# Patient Record
Sex: FEMALE | Race: OTHER | HISPANIC OR LATINO | URBAN - METROPOLITAN AREA
[De-identification: names, ages, dates, MRNs, and addresses within clinical notes are randomized per-mention and may not be internally consistent; named-entity substitution may affect disease eponyms.]

---

## 2018-08-28 ENCOUNTER — EMERGENCY (EMERGENCY)
Facility: HOSPITAL | Age: 63
LOS: 0 days | Discharge: HOME | End: 2018-08-28
Attending: STUDENT IN AN ORGANIZED HEALTH CARE EDUCATION/TRAINING PROGRAM
Payer: MEDICARE

## 2018-08-28 VITALS
SYSTOLIC BLOOD PRESSURE: 125 MMHG | OXYGEN SATURATION: 99 % | RESPIRATION RATE: 14 BRPM | HEART RATE: 61 BPM | DIASTOLIC BLOOD PRESSURE: 61 MMHG | TEMPERATURE: 98 F

## 2018-08-28 VITALS
WEIGHT: 130.07 LBS | TEMPERATURE: 98 F | RESPIRATION RATE: 18 BRPM | OXYGEN SATURATION: 99 % | HEIGHT: 65 IN | DIASTOLIC BLOOD PRESSURE: 64 MMHG | HEART RATE: 86 BPM | SYSTOLIC BLOOD PRESSURE: 137 MMHG

## 2018-08-28 DIAGNOSIS — Z88.9 ALLERGY STATUS TO UNSPECIFIED DRUGS, MEDICAMENTS AND BIOLOGICAL SUBSTANCES: ICD-10-CM

## 2018-08-28 DIAGNOSIS — J32.9 CHRONIC SINUSITIS, UNSPECIFIED: ICD-10-CM

## 2018-08-28 DIAGNOSIS — R05 COUGH: ICD-10-CM

## 2018-08-28 DIAGNOSIS — F40.240 CLAUSTROPHOBIA: ICD-10-CM

## 2018-08-28 DIAGNOSIS — Z88.0 ALLERGY STATUS TO PENICILLIN: ICD-10-CM

## 2018-08-28 DIAGNOSIS — J40 BRONCHITIS, NOT SPECIFIED AS ACUTE OR CHRONIC: ICD-10-CM

## 2018-08-28 DIAGNOSIS — F32.9 MAJOR DEPRESSIVE DISORDER, SINGLE EPISODE, UNSPECIFIED: ICD-10-CM

## 2018-08-28 DIAGNOSIS — J45.909 UNSPECIFIED ASTHMA, UNCOMPLICATED: ICD-10-CM

## 2018-08-28 LAB
ALBUMIN SERPL ELPH-MCNC: 4 G/DL — SIGNIFICANT CHANGE UP (ref 3.3–5)
ALP SERPL-CCNC: 61 U/L — SIGNIFICANT CHANGE UP (ref 40–120)
ALT FLD-CCNC: 22 U/L — SIGNIFICANT CHANGE UP (ref 12–78)
ANION GAP SERPL CALC-SCNC: 10 MMOL/L — SIGNIFICANT CHANGE UP (ref 5–17)
APTT BLD: 30 SEC — SIGNIFICANT CHANGE UP (ref 27.5–37.4)
AST SERPL-CCNC: 16 U/L — SIGNIFICANT CHANGE UP (ref 15–37)
BASOPHILS # BLD AUTO: 0.04 K/UL — SIGNIFICANT CHANGE UP (ref 0–0.2)
BASOPHILS NFR BLD AUTO: 0.7 % — SIGNIFICANT CHANGE UP (ref 0–2)
BILIRUB SERPL-MCNC: 0.8 MG/DL — SIGNIFICANT CHANGE UP (ref 0.2–1.2)
BUN SERPL-MCNC: 13 MG/DL — SIGNIFICANT CHANGE UP (ref 7–23)
CALCIUM SERPL-MCNC: 9.7 MG/DL — SIGNIFICANT CHANGE UP (ref 8.5–10.1)
CHLORIDE SERPL-SCNC: 105 MMOL/L — SIGNIFICANT CHANGE UP (ref 96–108)
CO2 SERPL-SCNC: 28 MMOL/L — SIGNIFICANT CHANGE UP (ref 22–31)
CREAT SERPL-MCNC: 0.78 MG/DL — SIGNIFICANT CHANGE UP (ref 0.5–1.3)
EOSINOPHIL # BLD AUTO: 0.13 K/UL — SIGNIFICANT CHANGE UP (ref 0–0.5)
EOSINOPHIL NFR BLD AUTO: 2.3 % — SIGNIFICANT CHANGE UP (ref 0–6)
GLUCOSE SERPL-MCNC: 125 MG/DL — HIGH (ref 70–99)
HCT VFR BLD CALC: 36.8 % — SIGNIFICANT CHANGE UP (ref 34.5–45)
HGB BLD-MCNC: 11.9 G/DL — SIGNIFICANT CHANGE UP (ref 11.5–15.5)
IMM GRANULOCYTES NFR BLD AUTO: 0.2 % — SIGNIFICANT CHANGE UP (ref 0–1.5)
INR BLD: 1.04 RATIO — SIGNIFICANT CHANGE UP (ref 0.88–1.16)
LACTATE SERPL-SCNC: 2.2 MMOL/L — HIGH (ref 0.7–2)
LACTATE SERPL-SCNC: 3 MMOL/L — HIGH (ref 0.7–2)
LYMPHOCYTES # BLD AUTO: 1.79 K/UL — SIGNIFICANT CHANGE UP (ref 1–3.3)
LYMPHOCYTES # BLD AUTO: 32.3 % — SIGNIFICANT CHANGE UP (ref 13–44)
MCHC RBC-ENTMCNC: 28.5 PG — SIGNIFICANT CHANGE UP (ref 27–34)
MCHC RBC-ENTMCNC: 32.3 GM/DL — SIGNIFICANT CHANGE UP (ref 32–36)
MCV RBC AUTO: 88 FL — SIGNIFICANT CHANGE UP (ref 80–100)
MONOCYTES # BLD AUTO: 0.46 K/UL — SIGNIFICANT CHANGE UP (ref 0–0.9)
MONOCYTES NFR BLD AUTO: 8.3 % — SIGNIFICANT CHANGE UP (ref 2–14)
NEUTROPHILS # BLD AUTO: 3.12 K/UL — SIGNIFICANT CHANGE UP (ref 1.8–7.4)
NEUTROPHILS NFR BLD AUTO: 56.2 % — SIGNIFICANT CHANGE UP (ref 43–77)
NRBC # BLD: 0 /100 WBCS — SIGNIFICANT CHANGE UP (ref 0–0)
PLATELET # BLD AUTO: 168 K/UL — SIGNIFICANT CHANGE UP (ref 150–400)
POTASSIUM SERPL-MCNC: 3.7 MMOL/L — SIGNIFICANT CHANGE UP (ref 3.5–5.3)
POTASSIUM SERPL-SCNC: 3.7 MMOL/L — SIGNIFICANT CHANGE UP (ref 3.5–5.3)
PROT SERPL-MCNC: 8.1 GM/DL — SIGNIFICANT CHANGE UP (ref 6–8.3)
PROTHROM AB SERPL-ACNC: 11.3 SEC — SIGNIFICANT CHANGE UP (ref 9.8–12.7)
RBC # BLD: 4.18 M/UL — SIGNIFICANT CHANGE UP (ref 3.8–5.2)
RBC # FLD: 12.6 % — SIGNIFICANT CHANGE UP (ref 10.3–14.5)
SODIUM SERPL-SCNC: 143 MMOL/L — SIGNIFICANT CHANGE UP (ref 135–145)
WBC # BLD: 5.55 K/UL — SIGNIFICANT CHANGE UP (ref 3.8–10.5)
WBC # FLD AUTO: 5.55 K/UL — SIGNIFICANT CHANGE UP (ref 3.8–10.5)

## 2018-08-28 PROCEDURE — 99284 EMERGENCY DEPT VISIT MOD MDM: CPT

## 2018-08-28 PROCEDURE — 70450 CT HEAD/BRAIN W/O DYE: CPT | Mod: 26

## 2018-08-28 PROCEDURE — 71045 X-RAY EXAM CHEST 1 VIEW: CPT | Mod: 26

## 2018-08-28 RX ORDER — AZITHROMYCIN 500 MG/1
500 TABLET, FILM COATED ORAL ONCE
Qty: 0 | Refills: 0 | Status: COMPLETED | OUTPATIENT
Start: 2018-08-28 | End: 2018-08-28

## 2018-08-28 RX ORDER — AZITHROMYCIN 500 MG/1
1 TABLET, FILM COATED ORAL
Qty: 4 | Refills: 0 | OUTPATIENT
Start: 2018-08-28 | End: 2018-08-31

## 2018-08-28 RX ORDER — SODIUM CHLORIDE 9 MG/ML
2000 INJECTION INTRAMUSCULAR; INTRAVENOUS; SUBCUTANEOUS ONCE
Qty: 0 | Refills: 0 | Status: COMPLETED | OUTPATIENT
Start: 2018-08-28 | End: 2018-08-28

## 2018-08-28 RX ADMIN — AZITHROMYCIN 255 MILLIGRAM(S): 500 TABLET, FILM COATED ORAL at 13:19

## 2018-08-28 RX ADMIN — AZITHROMYCIN 500 MILLIGRAM(S): 500 TABLET, FILM COATED ORAL at 14:45

## 2018-08-28 RX ADMIN — SODIUM CHLORIDE 1000 MILLILITER(S): 9 INJECTION INTRAMUSCULAR; INTRAVENOUS; SUBCUTANEOUS at 11:32

## 2018-08-28 RX ADMIN — SODIUM CHLORIDE 2000 MILLILITER(S): 9 INJECTION INTRAMUSCULAR; INTRAVENOUS; SUBCUTANEOUS at 14:45

## 2018-08-28 NOTE — ED ADULT NURSE NOTE - OBJECTIVE STATEMENT
received pt to bed 8 alert and oriented times 4. pt complaining of persistent cough times 2 days as well as slight chest pain.

## 2018-08-28 NOTE — ED PROVIDER NOTE - OBJECTIVE STATEMENT
63 year old female presents today c/o cold symptoms that started suddenly when she woke up this morning, pt states that she has been having a productive cough for two days, but this morning she became short of breath with coughing, she went to her pmd office where she developed chills, cough and sob, she was sent in for evaluation +chest pain with cough (-) recent travel (-) leg edema (-) hormonal medications

## 2018-08-28 NOTE — ED ADULT TRIAGE NOTE - CHIEF COMPLAINT QUOTE
BIBA for productive cough for 2 days, with chills, shortness of breath, hx of asthma. Sent by Dr delvalle office.

## 2018-08-28 NOTE — ED ADULT NURSE NOTE - NSIMPLEMENTINTERV_GEN_ALL_ED
Implemented All Universal Safety Interventions:  [place] to call system. Call bell, personal items and telephone within reach. Instruct patient to call for assistance. Room bathroom lighting operational. Non-slip footwear when patient is off stretcher. Physically safe environment: no spills, clutter or unnecessary equipment. Stretcher in lowest position, wheels locked, appropriate side rails in place.

## 2018-09-02 LAB
CULTURE RESULTS: SIGNIFICANT CHANGE UP
CULTURE RESULTS: SIGNIFICANT CHANGE UP
SPECIMEN SOURCE: SIGNIFICANT CHANGE UP
SPECIMEN SOURCE: SIGNIFICANT CHANGE UP

## 2019-04-01 ENCOUNTER — RESULT REVIEW (OUTPATIENT)
Age: 64
End: 2019-04-01

## 2019-04-04 ENCOUNTER — APPOINTMENT (OUTPATIENT)
Dept: UROGYNECOLOGY | Facility: CLINIC | Age: 64
End: 2019-04-04
Payer: MEDICARE

## 2019-04-04 DIAGNOSIS — Z80.3 FAMILY HISTORY OF MALIGNANT NEOPLASM OF BREAST: ICD-10-CM

## 2019-04-04 DIAGNOSIS — Z82.49 FAMILY HISTORY OF ISCHEMIC HEART DISEASE AND OTHER DISEASES OF THE CIRCULATORY SYSTEM: ICD-10-CM

## 2019-04-04 DIAGNOSIS — Z83.3 FAMILY HISTORY OF DIABETES MELLITUS: ICD-10-CM

## 2019-04-04 DIAGNOSIS — Z84.1 FAMILY HISTORY OF DISORDERS OF KIDNEY AND URETER: ICD-10-CM

## 2019-04-04 DIAGNOSIS — F32.9 MAJOR DEPRESSIVE DISORDER, SINGLE EPISODE, UNSPECIFIED: ICD-10-CM

## 2019-04-04 DIAGNOSIS — Z78.9 OTHER SPECIFIED HEALTH STATUS: ICD-10-CM

## 2019-04-04 DIAGNOSIS — J45.909 UNSPECIFIED ASTHMA, UNCOMPLICATED: ICD-10-CM

## 2019-04-04 DIAGNOSIS — N95.2 POSTMENOPAUSAL ATROPHIC VAGINITIS: ICD-10-CM

## 2019-04-04 DIAGNOSIS — R31.0 GROSS HEMATURIA: ICD-10-CM

## 2019-04-04 LAB
BILIRUB UR QL STRIP: NORMAL
CLARITY UR: CLEAR
COLLECTION METHOD: NORMAL
GLUCOSE UR-MCNC: NORMAL
HCG UR QL: 0.2 EU/DL
HGB UR QL STRIP.AUTO: NORMAL
KETONES UR-MCNC: NORMAL
LEUKOCYTE ESTERASE UR QL STRIP: NORMAL
NITRITE UR QL STRIP: NORMAL
PH UR STRIP: 6
PROT UR STRIP-MCNC: NORMAL
SP GR UR STRIP: 1.02

## 2019-04-04 PROCEDURE — 99204 OFFICE O/P NEW MOD 45 MIN: CPT

## 2019-04-04 RX ORDER — BUDESONIDE AND FORMOTEROL FUMARATE DIHYDRATE 80; 4.5 UG/1; UG/1
80-4.5 AEROSOL RESPIRATORY (INHALATION)
Refills: 0 | Status: ACTIVE | COMMUNITY

## 2019-04-04 RX ORDER — ALBUTEROL SULFATE 90 UG/1
INHALANT RESPIRATORY (INHALATION)
Refills: 0 | Status: ACTIVE | COMMUNITY

## 2019-04-04 RX ORDER — SERTRALINE 25 MG/1
TABLET, FILM COATED ORAL
Refills: 0 | Status: ACTIVE | COMMUNITY

## 2019-04-04 RX ORDER — OLOPATADINE HYDROCHLORIDE 1 MG/ML
SOLUTION/ DROPS OPHTHALMIC
Refills: 0 | Status: ACTIVE | COMMUNITY

## 2019-04-04 RX ORDER — RANITIDINE 75 MG/1
TABLET ORAL
Refills: 0 | Status: ACTIVE | COMMUNITY

## 2019-04-04 NOTE — PHYSICAL EXAM
[No Acute Distress] : in no acute distress [Well Nourished] : ~L well nourished [Good Hygeine] : demonstrates good hygeine [Oriented x3] : oriented to person, place, and time [Normal Mood/Affect] : mood and affect are normal [Bulbocavernous] : bulbocavernous was present [Anal Reflex] : the anal reflex was present [Warm and Dry] : was warm and dry to touch [Normal Gait] : gait was normal [Labia Majora] : were normal [Normal Appearance] : general appearance was normal [Atrophy] : atrophy [No Bleeding] : there was no active vaginal bleeding [Uterine Adnexae] : were not tender and not enlarged [Normal] : no abnormalities [Post Void Residual ____ml] : post void residual via catheterization was [unfilled] ml [Normal rectal exam] : was normal [Anxiety] : patient is not anxious [Tenderness] : ~T no ~M abdominal tenderness observed [Distended] : not distended [H/Smegaly] : no hepatosplenomegaly [Inguinal LAD] : no adenopathy was noted in the inguinal lymph nodes [de-identified] : no prolapse

## 2019-04-04 NOTE — HISTORY OF PRESENT ILLNESS
[Cystocele (Obstetric)] : none [Rectal Prolapse] : none [Unable To Restrain Bowel Movement] : none [Urinary Frequency] : none [Feelings Of Urinary Urgency] : none [Urinary Frequency More Than Twice At Night (Nocturia)] : none [Urinary Tract Infection] : none [Constipation Obstructed Defecation] : none [Stool Visible Blood] : none [Incomplete Emptying Of Stool] : none [Pelvic Pain] : none [FreeTextEntry1] : \par Presents for hematuria. Pt reports having 3 episodes of gross hematuria in her lifetime, each with a positive urine Cx. She states that she had a recent UTI that was treated with Abx. This was her only UTI in the past year. We called Dr. Johnson's office and talked to JOSE Ornelas who faxed over her culture results, which were positive for an E.coli UTI.\par \par Of note, patient is planning to undergo a Gyn procedure with [facility]. She is unsure what the procedure is or who is performing it/why it is being performed. \par \par PMH: Depression, Asthma\par PSH: open partial salpingectomy \par Meds: Albuterol, Symbicort, Ranitidine, Zyrtec

## 2019-04-04 NOTE — DISCUSSION/SUMMARY
[Reviewed Clinical Lab Test(s)] : Results of clinical tests were reviewed. [Discuss Alternatives/Risks/Benefits w/Patient] : All alternatives, risks, and benefits were discussed with the patient/family and all questions were answered.  Patient expressed good understanding and appreciates the importance of follow up as recommended. [FreeTextEntry1] : 64yo with a recent UTI (one in past year) associated with gross hematuria. Hematuria now resolved and urine dip clear today. Hematuria explained by UTI. She does not need any further workup or evaluation for hematuria. No abnormal findings on exam. She is to return if she has Sx of a UTI. She will f/u with her Gyn regarding uncertainty with her current diagnosis/future procedure.

## 2019-04-04 NOTE — PROCEDURE
[FreeTextEntry1] : Catheterization obtained to assess for hematuria. Urethral meatus prepped with betadine. Catheter inserted. PVR 50cc obtained. Pt tolerated well.

## 2019-04-04 NOTE — LETTER BODY
[Dear  ___] : Dear  [unfilled], [Dear  ___] : Dear ~NICOLE, [I had the pleasure of evaluating your patient, [unfilled]. Thank you for referring Ms. [unfilled] for consultation for ___] : I had the pleasure of evaluating your patient, [unfilled]. Thank you for referring Ms. [unfilled] for consultation for [unfilled]. [Attached please find my note.] : Attached please find my note. [Thank you very much for allowing me to participate in the care of this patient. If you have any questions, please do not hesitate to contact me] : Thank you very much for allowing me to participate in the care of this patient. If you have any questions, please do not hesitate to contact me.

## 2019-04-20 ENCOUNTER — RESULT REVIEW (OUTPATIENT)
Age: 64
End: 2019-04-20

## 2019-08-28 PROBLEM — Z00.00 ENCOUNTER FOR PREVENTIVE HEALTH EXAMINATION: Status: ACTIVE | Noted: 2019-08-28

## 2019-08-30 ENCOUNTER — APPOINTMENT (OUTPATIENT)
Dept: ORTHOPEDIC SURGERY | Facility: CLINIC | Age: 64
End: 2019-08-30
Payer: MEDICARE

## 2019-08-30 VITALS
WEIGHT: 130 LBS | SYSTOLIC BLOOD PRESSURE: 154 MMHG | BODY MASS INDEX: 21.66 KG/M2 | DIASTOLIC BLOOD PRESSURE: 84 MMHG | HEIGHT: 65 IN | HEART RATE: 66 BPM

## 2019-08-30 PROCEDURE — 99204 OFFICE O/P NEW MOD 45 MIN: CPT

## 2019-08-30 PROCEDURE — 99203 OFFICE O/P NEW LOW 30 MIN: CPT

## 2019-08-30 PROCEDURE — 72100 X-RAY EXAM L-S SPINE 2/3 VWS: CPT

## 2019-09-03 ENCOUNTER — OTHER (OUTPATIENT)
Age: 64
End: 2019-09-03

## 2019-09-04 ENCOUNTER — RX RENEWAL (OUTPATIENT)
Age: 64
End: 2019-09-04

## 2019-09-04 RX ORDER — DIAZEPAM 5 MG/1
5 TABLET ORAL
Qty: 1 | Refills: 0 | Status: ACTIVE | COMMUNITY
Start: 2019-09-04 | End: 1900-01-01

## 2019-09-09 ENCOUNTER — RX RENEWAL (OUTPATIENT)
Age: 64
End: 2019-09-09

## 2019-09-09 RX ORDER — DIAZEPAM 5 MG/1
5 TABLET ORAL
Qty: 1 | Refills: 0 | Status: ACTIVE | COMMUNITY
Start: 2019-09-09 | End: 1900-01-01

## 2019-10-01 ENCOUNTER — OUTPATIENT (OUTPATIENT)
Dept: OUTPATIENT SERVICES | Facility: HOSPITAL | Age: 64
LOS: 1 days | End: 2019-10-01
Payer: MEDICARE

## 2019-10-03 ENCOUNTER — APPOINTMENT (OUTPATIENT)
Dept: ORTHOPEDIC SURGERY | Facility: CLINIC | Age: 64
End: 2019-10-03
Payer: MEDICARE

## 2019-10-03 ENCOUNTER — APPOINTMENT (OUTPATIENT)
Dept: ORTHOPEDIC SURGERY | Facility: CLINIC | Age: 64
End: 2019-10-03

## 2019-10-03 VITALS
DIASTOLIC BLOOD PRESSURE: 80 MMHG | WEIGHT: 150 LBS | BODY MASS INDEX: 24.99 KG/M2 | SYSTOLIC BLOOD PRESSURE: 124 MMHG | HEART RATE: 65 BPM | HEIGHT: 65 IN

## 2019-10-03 DIAGNOSIS — M48.07 SPINAL STENOSIS, LUMBOSACRAL REGION: ICD-10-CM

## 2019-10-03 DIAGNOSIS — M51.36 OTHER INTERVERTEBRAL DISC DEGENERATION, LUMBAR REGION: ICD-10-CM

## 2019-10-03 PROCEDURE — 99214 OFFICE O/P EST MOD 30 MIN: CPT

## 2019-10-10 DIAGNOSIS — Z71.89 OTHER SPECIFIED COUNSELING: ICD-10-CM

## 2019-10-22 ENCOUNTER — EMERGENCY (EMERGENCY)
Facility: HOSPITAL | Age: 64
LOS: 0 days | Discharge: HOME | End: 2019-10-22
Attending: EMERGENCY MEDICINE
Payer: MEDICARE

## 2019-10-22 VITALS
OXYGEN SATURATION: 99 % | DIASTOLIC BLOOD PRESSURE: 77 MMHG | SYSTOLIC BLOOD PRESSURE: 143 MMHG | TEMPERATURE: 98 F | RESPIRATION RATE: 20 BRPM | HEART RATE: 65 BPM

## 2019-10-22 VITALS
HEART RATE: 79 BPM | OXYGEN SATURATION: 98 % | DIASTOLIC BLOOD PRESSURE: 72 MMHG | TEMPERATURE: 98 F | SYSTOLIC BLOOD PRESSURE: 118 MMHG | RESPIRATION RATE: 19 BRPM

## 2019-10-22 DIAGNOSIS — R55 SYNCOPE AND COLLAPSE: ICD-10-CM

## 2019-10-22 DIAGNOSIS — Z88.0 ALLERGY STATUS TO PENICILLIN: ICD-10-CM

## 2019-10-22 DIAGNOSIS — R42 DIZZINESS AND GIDDINESS: ICD-10-CM

## 2019-10-22 DIAGNOSIS — Z88.8 ALLERGY STATUS TO OTHER DRUGS, MEDICAMENTS AND BIOLOGICAL SUBSTANCES STATUS: ICD-10-CM

## 2019-10-22 LAB
ALBUMIN SERPL ELPH-MCNC: 3.9 G/DL — SIGNIFICANT CHANGE UP (ref 3.3–5)
ALP SERPL-CCNC: 68 U/L — SIGNIFICANT CHANGE UP (ref 40–120)
ALT FLD-CCNC: 32 U/L — SIGNIFICANT CHANGE UP (ref 12–78)
ANION GAP SERPL CALC-SCNC: 4 MMOL/L — LOW (ref 5–17)
APTT BLD: 29.2 SEC — SIGNIFICANT CHANGE UP (ref 28.5–37)
AST SERPL-CCNC: 32 U/L — SIGNIFICANT CHANGE UP (ref 15–37)
BILIRUB SERPL-MCNC: 0.5 MG/DL — SIGNIFICANT CHANGE UP (ref 0.2–1.2)
BUN SERPL-MCNC: 13 MG/DL — SIGNIFICANT CHANGE UP (ref 7–23)
CALCIUM SERPL-MCNC: 10 MG/DL — SIGNIFICANT CHANGE UP (ref 8.5–10.1)
CHLORIDE SERPL-SCNC: 105 MMOL/L — SIGNIFICANT CHANGE UP (ref 96–108)
CO2 SERPL-SCNC: 30 MMOL/L — SIGNIFICANT CHANGE UP (ref 22–31)
CREAT SERPL-MCNC: 0.56 MG/DL — SIGNIFICANT CHANGE UP (ref 0.5–1.3)
D DIMER BLD IA.RAPID-MCNC: <150 NG/ML DDU — SIGNIFICANT CHANGE UP
GLUCOSE BLDC GLUCOMTR-MCNC: 87 MG/DL — SIGNIFICANT CHANGE UP (ref 70–99)
GLUCOSE SERPL-MCNC: 92 MG/DL — SIGNIFICANT CHANGE UP (ref 70–99)
HCT VFR BLD CALC: 37.1 % — SIGNIFICANT CHANGE UP (ref 34.5–45)
HGB BLD-MCNC: 12.1 G/DL — SIGNIFICANT CHANGE UP (ref 11.5–15.5)
INR BLD: 1.02 RATIO — SIGNIFICANT CHANGE UP (ref 0.88–1.16)
MCHC RBC-ENTMCNC: 28.3 PG — SIGNIFICANT CHANGE UP (ref 27–34)
MCHC RBC-ENTMCNC: 32.6 GM/DL — SIGNIFICANT CHANGE UP (ref 32–36)
MCV RBC AUTO: 86.7 FL — SIGNIFICANT CHANGE UP (ref 80–100)
NRBC # BLD: 0 /100 WBCS — SIGNIFICANT CHANGE UP (ref 0–0)
NT-PROBNP SERPL-SCNC: 135 PG/ML — HIGH (ref 0–125)
PLATELET # BLD AUTO: 182 K/UL — SIGNIFICANT CHANGE UP (ref 150–400)
POTASSIUM SERPL-MCNC: 4.3 MMOL/L — SIGNIFICANT CHANGE UP (ref 3.5–5.3)
POTASSIUM SERPL-SCNC: 4.3 MMOL/L — SIGNIFICANT CHANGE UP (ref 3.5–5.3)
PROT SERPL-MCNC: 7.9 GM/DL — SIGNIFICANT CHANGE UP (ref 6–8.3)
PROTHROM AB SERPL-ACNC: 11.4 SEC — SIGNIFICANT CHANGE UP (ref 10–12.9)
RBC # BLD: 4.28 M/UL — SIGNIFICANT CHANGE UP (ref 3.8–5.2)
RBC # FLD: 12.6 % — SIGNIFICANT CHANGE UP (ref 10.3–14.5)
SODIUM SERPL-SCNC: 139 MMOL/L — SIGNIFICANT CHANGE UP (ref 135–145)
TROPONIN I SERPL-MCNC: <.015 NG/ML — SIGNIFICANT CHANGE UP (ref 0.01–0.04)
TROPONIN I SERPL-MCNC: <.015 NG/ML — SIGNIFICANT CHANGE UP (ref 0.01–0.04)
WBC # BLD: 6.02 K/UL — SIGNIFICANT CHANGE UP (ref 3.8–10.5)
WBC # FLD AUTO: 6.02 K/UL — SIGNIFICANT CHANGE UP (ref 3.8–10.5)

## 2019-10-22 PROCEDURE — 99285 EMERGENCY DEPT VISIT HI MDM: CPT

## 2019-10-22 PROCEDURE — 71045 X-RAY EXAM CHEST 1 VIEW: CPT | Mod: 26

## 2019-10-22 PROCEDURE — 93010 ELECTROCARDIOGRAM REPORT: CPT

## 2019-10-22 RX ORDER — SODIUM CHLORIDE 9 MG/ML
1000 INJECTION INTRAMUSCULAR; INTRAVENOUS; SUBCUTANEOUS ONCE
Refills: 0 | Status: COMPLETED | OUTPATIENT
Start: 2019-10-22 | End: 2019-10-22

## 2019-10-22 RX ORDER — ACETAMINOPHEN 500 MG
650 TABLET ORAL ONCE
Refills: 0 | Status: COMPLETED | OUTPATIENT
Start: 2019-10-22 | End: 2019-10-22

## 2019-10-22 RX ADMIN — Medication 650 MILLIGRAM(S): at 16:25

## 2019-10-22 RX ADMIN — SODIUM CHLORIDE 1000 MILLILITER(S): 9 INJECTION INTRAMUSCULAR; INTRAVENOUS; SUBCUTANEOUS at 14:55

## 2019-10-22 NOTE — ED PROVIDER NOTE - PROGRESS NOTE DETAILS
Pt has been asymptomatic during ED stay. Labs wnl, pt referred to cardiology for f/u, pt feeling well, ready for d/c.

## 2019-10-22 NOTE — ED PROVIDER NOTE - OBJECTIVE STATEMENT
Pt is a 65 yo lady with a pmhx of HTN, HL, asthma who presents to the ED with near syncope event. She was at physical therapy and doing squats when she started to feel lightheaded and see spots. She crouched down, and symptoms went away. No chest pain, no sob, no loc. No hx of this in the past. Asymptomatic now. No hx of this in the past.

## 2019-10-22 NOTE — ED PROVIDER NOTE - PATIENT PORTAL LINK FT
You can access the FollowMyHealth Patient Portal offered by [facility] by registering at the following website: [email or web address]. By joining [employer]’s FollowMyHealth portal, you will also be able to view your health information using other applications (apps) compatible with our system.

## 2019-10-22 NOTE — ED ADULT TRIAGE NOTE - CHIEF COMPLAINT QUOTE
BIBA for dizziness and syncopal episode while at physical therapy. pt complaining of involuntary shaking and shortness of breath at triage. fs 86 at triage.

## 2019-10-22 NOTE — ED ADULT NURSE NOTE - NSIMPLEMENTINTERV_GEN_ALL_ED
Implemented All Fall Risk Interventions:  [place] to call system. Call bell, personal items and telephone within reach. Instruct patient to call for assistance. Room bathroom lighting operational. Non-slip footwear when patient is off stretcher. Physically safe environment: no spills, clutter or unnecessary equipment. Stretcher in lowest position, wheels locked, appropriate side rails in place. Provide visual cue, wrist band, yellow gown, etc. Monitor gait and stability. Monitor for mental status changes and reorient to person, place, and time. Review medications for side effects contributing to fall risk. Reinforce activity limits and safety measures with patient and family.

## 2019-10-22 NOTE — ED PROVIDER NOTE - CLINICAL SUMMARY MEDICAL DECISION MAKING FREE TEXT BOX
Ddx: Near syncope, likely vasovagal/ no chest pain to suggest acs or sob to suggest PE  Plan: Cbc, cmp, troponin, ecg, fluids, reassess

## 2019-10-22 NOTE — ED ADULT NURSE NOTE - OBJECTIVE STATEMENT
pt A&Ox4 BIBA for dizziness and syncopal episode while at physical therapy. pt complaining of involuntary shaking. states the shaking occurs prior to her depressive episode. also with c/o headache. PMH Asthma, Depression herniated disc

## 2020-09-01 ENCOUNTER — INPATIENT (INPATIENT)
Facility: HOSPITAL | Age: 65
LOS: 2 days | Discharge: HOME | DRG: 392 | End: 2020-09-04
Attending: HOSPITALIST | Admitting: INTERNAL MEDICINE
Payer: MEDICARE

## 2020-09-01 VITALS
DIASTOLIC BLOOD PRESSURE: 86 MMHG | OXYGEN SATURATION: 98 % | HEART RATE: 77 BPM | RESPIRATION RATE: 18 BRPM | SYSTOLIC BLOOD PRESSURE: 138 MMHG | WEIGHT: 139.99 LBS | HEIGHT: 65 IN | TEMPERATURE: 99 F

## 2020-09-01 DIAGNOSIS — F32.9 MAJOR DEPRESSIVE DISORDER, SINGLE EPISODE, UNSPECIFIED: ICD-10-CM

## 2020-09-01 DIAGNOSIS — E78.5 HYPERLIPIDEMIA, UNSPECIFIED: ICD-10-CM

## 2020-09-01 DIAGNOSIS — R07.9 CHEST PAIN, UNSPECIFIED: ICD-10-CM

## 2020-09-01 DIAGNOSIS — E11.9 TYPE 2 DIABETES MELLITUS WITHOUT COMPLICATIONS: ICD-10-CM

## 2020-09-01 DIAGNOSIS — J45.909 UNSPECIFIED ASTHMA, UNCOMPLICATED: ICD-10-CM

## 2020-09-01 LAB
A1C WITH ESTIMATED AVERAGE GLUCOSE RESULT: 9.2 % — HIGH (ref 4–5.6)
ALBUMIN SERPL ELPH-MCNC: 4.6 G/DL — SIGNIFICANT CHANGE UP (ref 3.3–5)
ALP SERPL-CCNC: 67 U/L — SIGNIFICANT CHANGE UP (ref 40–120)
ALT FLD-CCNC: 19 U/L — SIGNIFICANT CHANGE UP (ref 10–45)
ANION GAP SERPL CALC-SCNC: 11 MMOL/L — SIGNIFICANT CHANGE UP (ref 5–17)
APTT BLD: 28.2 SEC — SIGNIFICANT CHANGE UP (ref 27.5–35.5)
AST SERPL-CCNC: 17 U/L — SIGNIFICANT CHANGE UP (ref 10–40)
BASOPHILS # BLD AUTO: 0.04 K/UL — SIGNIFICANT CHANGE UP (ref 0–0.2)
BASOPHILS NFR BLD AUTO: 0.7 % — SIGNIFICANT CHANGE UP (ref 0–2)
BILIRUB SERPL-MCNC: 0.5 MG/DL — SIGNIFICANT CHANGE UP (ref 0.2–1.2)
BUN SERPL-MCNC: 19 MG/DL — SIGNIFICANT CHANGE UP (ref 7–23)
CALCIUM SERPL-MCNC: 9.8 MG/DL — SIGNIFICANT CHANGE UP (ref 8.4–10.5)
CHLORIDE SERPL-SCNC: 101 MMOL/L — SIGNIFICANT CHANGE UP (ref 96–108)
CK MB CFR SERPL CALC: 1.6 NG/ML — SIGNIFICANT CHANGE UP (ref 0–6.7)
CK MB CFR SERPL CALC: 1.8 NG/ML — SIGNIFICANT CHANGE UP (ref 0–6.7)
CK SERPL-CCNC: 59 U/L — SIGNIFICANT CHANGE UP (ref 25–170)
CK SERPL-CCNC: 68 U/L — SIGNIFICANT CHANGE UP (ref 25–170)
CO2 SERPL-SCNC: 26 MMOL/L — SIGNIFICANT CHANGE UP (ref 22–31)
CREAT SERPL-MCNC: 0.67 MG/DL — SIGNIFICANT CHANGE UP (ref 0.5–1.3)
D DIMER BLD IA.RAPID-MCNC: 163 NG/ML DDU — SIGNIFICANT CHANGE UP
EOSINOPHIL # BLD AUTO: 0.15 K/UL — SIGNIFICANT CHANGE UP (ref 0–0.5)
EOSINOPHIL NFR BLD AUTO: 2.6 % — SIGNIFICANT CHANGE UP (ref 0–6)
ESTIMATED AVERAGE GLUCOSE: 217 MG/DL — HIGH (ref 68–114)
GLUCOSE BLDC GLUCOMTR-MCNC: 269 MG/DL — HIGH (ref 70–99)
GLUCOSE SERPL-MCNC: 165 MG/DL — HIGH (ref 70–99)
HCT VFR BLD CALC: 36.2 % — SIGNIFICANT CHANGE UP (ref 34.5–45)
HGB BLD-MCNC: 11.6 G/DL — SIGNIFICANT CHANGE UP (ref 11.5–15.5)
IMM GRANULOCYTES NFR BLD AUTO: 0.2 % — SIGNIFICANT CHANGE UP (ref 0–1.5)
INR BLD: 0.99 — SIGNIFICANT CHANGE UP (ref 0.88–1.16)
LYMPHOCYTES # BLD AUTO: 1.83 K/UL — SIGNIFICANT CHANGE UP (ref 1–3.3)
LYMPHOCYTES # BLD AUTO: 32 % — SIGNIFICANT CHANGE UP (ref 13–44)
MCHC RBC-ENTMCNC: 27.8 PG — SIGNIFICANT CHANGE UP (ref 27–34)
MCHC RBC-ENTMCNC: 32 GM/DL — SIGNIFICANT CHANGE UP (ref 32–36)
MCV RBC AUTO: 86.8 FL — SIGNIFICANT CHANGE UP (ref 80–100)
MONOCYTES # BLD AUTO: 0.44 K/UL — SIGNIFICANT CHANGE UP (ref 0–0.9)
MONOCYTES NFR BLD AUTO: 7.7 % — SIGNIFICANT CHANGE UP (ref 2–14)
NEUTROPHILS # BLD AUTO: 3.24 K/UL — SIGNIFICANT CHANGE UP (ref 1.8–7.4)
NEUTROPHILS NFR BLD AUTO: 56.8 % — SIGNIFICANT CHANGE UP (ref 43–77)
NRBC # BLD: 0 /100 WBCS — SIGNIFICANT CHANGE UP (ref 0–0)
PLATELET # BLD AUTO: 179 K/UL — SIGNIFICANT CHANGE UP (ref 150–400)
POTASSIUM SERPL-MCNC: 4.3 MMOL/L — SIGNIFICANT CHANGE UP (ref 3.5–5.3)
POTASSIUM SERPL-SCNC: 4.3 MMOL/L — SIGNIFICANT CHANGE UP (ref 3.5–5.3)
PROT SERPL-MCNC: 7.9 G/DL — SIGNIFICANT CHANGE UP (ref 6–8.3)
PROTHROM AB SERPL-ACNC: 11.9 SEC — SIGNIFICANT CHANGE UP (ref 10.6–13.6)
RBC # BLD: 4.17 M/UL — SIGNIFICANT CHANGE UP (ref 3.8–5.2)
RBC # FLD: 12.3 % — SIGNIFICANT CHANGE UP (ref 10.3–14.5)
SARS-COV-2 RNA SPEC QL NAA+PROBE: SIGNIFICANT CHANGE UP
SODIUM SERPL-SCNC: 138 MMOL/L — SIGNIFICANT CHANGE UP (ref 135–145)
TROPONIN T SERPL-MCNC: <0.01 NG/ML — SIGNIFICANT CHANGE UP (ref 0–0.01)
TROPONIN T SERPL-MCNC: <0.01 NG/ML — SIGNIFICANT CHANGE UP (ref 0–0.01)
WBC # BLD: 5.71 K/UL — SIGNIFICANT CHANGE UP (ref 3.8–10.5)
WBC # FLD AUTO: 5.71 K/UL — SIGNIFICANT CHANGE UP (ref 3.8–10.5)

## 2020-09-01 PROCEDURE — 71045 X-RAY EXAM CHEST 1 VIEW: CPT | Mod: 26

## 2020-09-01 PROCEDURE — 99285 EMERGENCY DEPT VISIT HI MDM: CPT | Mod: 25

## 2020-09-01 PROCEDURE — 99222 1ST HOSP IP/OBS MODERATE 55: CPT

## 2020-09-01 PROCEDURE — 93010 ELECTROCARDIOGRAM REPORT: CPT

## 2020-09-01 RX ORDER — DEXTROSE 50 % IN WATER 50 %
25 SYRINGE (ML) INTRAVENOUS ONCE
Refills: 0 | Status: DISCONTINUED | OUTPATIENT
Start: 2020-09-01 | End: 2020-09-04

## 2020-09-01 RX ORDER — SODIUM CHLORIDE 9 MG/ML
1000 INJECTION INTRAMUSCULAR; INTRAVENOUS; SUBCUTANEOUS
Refills: 0 | Status: DISCONTINUED | OUTPATIENT
Start: 2020-09-01 | End: 2020-09-02

## 2020-09-01 RX ORDER — INFLUENZA VIRUS VACCINE 15; 15; 15; 15 UG/.5ML; UG/.5ML; UG/.5ML; UG/.5ML
0.5 SUSPENSION INTRAMUSCULAR ONCE
Refills: 0 | Status: DISCONTINUED | OUTPATIENT
Start: 2020-09-01 | End: 2020-09-04

## 2020-09-01 RX ORDER — SODIUM CHLORIDE 9 MG/ML
3 INJECTION INTRAMUSCULAR; INTRAVENOUS; SUBCUTANEOUS ONCE
Refills: 0 | Status: COMPLETED | OUTPATIENT
Start: 2020-09-01 | End: 2020-09-01

## 2020-09-01 RX ORDER — ENOXAPARIN SODIUM 100 MG/ML
40 INJECTION SUBCUTANEOUS EVERY 24 HOURS
Refills: 0 | Status: DISCONTINUED | OUTPATIENT
Start: 2020-09-01 | End: 2020-09-04

## 2020-09-01 RX ORDER — MORPHINE SULFATE 50 MG/1
2 CAPSULE, EXTENDED RELEASE ORAL ONCE
Refills: 0 | Status: DISCONTINUED | OUTPATIENT
Start: 2020-09-01 | End: 2020-09-01

## 2020-09-01 RX ORDER — BUDESONIDE AND FORMOTEROL FUMARATE DIHYDRATE 160; 4.5 UG/1; UG/1
2 AEROSOL RESPIRATORY (INHALATION)
Refills: 0 | Status: DISCONTINUED | OUTPATIENT
Start: 2020-09-01 | End: 2020-09-04

## 2020-09-01 RX ORDER — NITROGLYCERIN 6.5 MG
0.5 CAPSULE, EXTENDED RELEASE ORAL ONCE
Refills: 0 | Status: COMPLETED | OUTPATIENT
Start: 2020-09-01 | End: 2020-09-01

## 2020-09-01 RX ORDER — GLUCAGON INJECTION, SOLUTION 0.5 MG/.1ML
1 INJECTION, SOLUTION SUBCUTANEOUS ONCE
Refills: 0 | Status: DISCONTINUED | OUTPATIENT
Start: 2020-09-01 | End: 2020-09-04

## 2020-09-01 RX ORDER — POLYETHYLENE GLYCOL 3350 17 G/17G
17 POWDER, FOR SOLUTION ORAL DAILY
Refills: 0 | Status: DISCONTINUED | OUTPATIENT
Start: 2020-09-01 | End: 2020-09-04

## 2020-09-01 RX ORDER — DEXTROSE 50 % IN WATER 50 %
12.5 SYRINGE (ML) INTRAVENOUS ONCE
Refills: 0 | Status: DISCONTINUED | OUTPATIENT
Start: 2020-09-01 | End: 2020-09-04

## 2020-09-01 RX ORDER — KETOTIFEN FUMARATE 0.34 MG/ML
1 SOLUTION OPHTHALMIC DAILY
Refills: 0 | Status: DISCONTINUED | OUTPATIENT
Start: 2020-09-01 | End: 2020-09-04

## 2020-09-01 RX ORDER — SIMVASTATIN 20 MG/1
10 TABLET, FILM COATED ORAL AT BEDTIME
Refills: 0 | Status: DISCONTINUED | OUTPATIENT
Start: 2020-09-01 | End: 2020-09-02

## 2020-09-01 RX ORDER — INSULIN LISPRO 100/ML
VIAL (ML) SUBCUTANEOUS
Refills: 0 | Status: DISCONTINUED | OUTPATIENT
Start: 2020-09-01 | End: 2020-09-04

## 2020-09-01 RX ORDER — SERTRALINE 25 MG/1
50 TABLET, FILM COATED ORAL DAILY
Refills: 0 | Status: DISCONTINUED | OUTPATIENT
Start: 2020-09-01 | End: 2020-09-04

## 2020-09-01 RX ORDER — CLOPIDOGREL BISULFATE 75 MG/1
75 TABLET, FILM COATED ORAL ONCE
Refills: 0 | Status: COMPLETED | OUTPATIENT
Start: 2020-09-01 | End: 2020-09-01

## 2020-09-01 RX ORDER — DEXTROSE 50 % IN WATER 50 %
15 SYRINGE (ML) INTRAVENOUS ONCE
Refills: 0 | Status: DISCONTINUED | OUTPATIENT
Start: 2020-09-01 | End: 2020-09-04

## 2020-09-01 RX ORDER — SODIUM CHLORIDE 9 MG/ML
1000 INJECTION, SOLUTION INTRAVENOUS
Refills: 0 | Status: DISCONTINUED | OUTPATIENT
Start: 2020-09-01 | End: 2020-09-04

## 2020-09-01 RX ADMIN — SODIUM CHLORIDE 125 MILLILITER(S): 9 INJECTION INTRAMUSCULAR; INTRAVENOUS; SUBCUTANEOUS at 20:35

## 2020-09-01 RX ADMIN — CLOPIDOGREL BISULFATE 75 MILLIGRAM(S): 75 TABLET, FILM COATED ORAL at 16:04

## 2020-09-01 RX ADMIN — SODIUM CHLORIDE 125 MILLILITER(S): 9 INJECTION INTRAMUSCULAR; INTRAVENOUS; SUBCUTANEOUS at 17:01

## 2020-09-01 RX ADMIN — SIMVASTATIN 10 MILLIGRAM(S): 20 TABLET, FILM COATED ORAL at 22:14

## 2020-09-01 RX ADMIN — BUDESONIDE AND FORMOTEROL FUMARATE DIHYDRATE 2 PUFF(S): 160; 4.5 AEROSOL RESPIRATORY (INHALATION) at 21:03

## 2020-09-01 RX ADMIN — ENOXAPARIN SODIUM 40 MILLIGRAM(S): 100 INJECTION SUBCUTANEOUS at 22:03

## 2020-09-01 RX ADMIN — SODIUM CHLORIDE 3 MILLILITER(S): 9 INJECTION INTRAMUSCULAR; INTRAVENOUS; SUBCUTANEOUS at 15:34

## 2020-09-01 RX ADMIN — Medication 6: at 22:32

## 2020-09-01 RX ADMIN — MORPHINE SULFATE 2 MILLIGRAM(S): 50 CAPSULE, EXTENDED RELEASE ORAL at 16:04

## 2020-09-01 NOTE — ED PROVIDER NOTE - PROGRESS NOTE DETAILS
Trey Welch, MS4:  Patient is HD stable and febrile.  No acute changes in exam.  EKG, trops, d-dimer wnl.  Official TTE and CXR reads pending.  S/p plavix x1 and morphine x1.

## 2020-09-01 NOTE — H&P ADULT - ATTENDING COMMENTS
See PA note written above, for details. I reviewed the PA documentation.  I have personally seen and examined this patient 9/1/20. I reviewed vitals, labs, medications, cardiac studies and additional imaging.  I agree with the PA's findings and plans as written above with the following additions/amendments:  Patient presenting with atypical chest pain, prolonged, brought on with stress of living in shelter  Non focal exam, euvolemic, RRR, no MGR, clear lungs, no edema  Plan for:  Simvastatin 10  Obtain HbA1c, TFTs, and FLP  Obtain TTE for structural assessment  NPO pMN for CCTA for coronary evaluation   KATERIN Olmos.  Cardiology Attending

## 2020-09-01 NOTE — H&P ADULT - HISTORY OF PRESENT ILLNESS
66 yo Female w/ PMHx TD2M, HLD, Major Depressive Disorder presenting with acute onset chest pain w/ radiation to L neck, jaw, and UE x3d duration BIBEMS s/p nitro in the field. Patient stated she started experiencing pain in the middle of her chest with radiation to L side, no radiation to back.  Pain is a/w N/V and SOB, intermittent waxing and waning intensity throughout day, no direct association with PO intake.  She also had 1 episode of dizziness and blurry vision during this time where she almost fell, no LoC or head trauma.  She has had similar episodes in the past (1-2x/yr) but of lesser intensity.  Denies h/o panic attacks, fevers, chills, URI sx, abd pain, changes in urine/stool frequency or color, hematuria/hematochezia/melena, focal neuromuscular deficits.  Patient is adherent with T2DM and HLD regimen, no h/o smoking, EtOH or drug use.  Patient lives in a shelter, follows up regularly with PMD with evaluations for similar episodes in past with normal EKGs.  Patient has severe allergic rxn to ASA in recent years requiring epinephrine.  She also endorses MDD treated with medication, states she did not feel safe in her shelter since "I live alone and no one would know if anything happened to me", but feels safe in ED and denies SI/HI, anxiety, depression sx at this time. 66 yo Female, shelter resident, [employer], w/ PMHx TD2M, HLD, Major Depressive Disorder presenting with worsening chest pain, radiating to left jaw, neck, and arm, with associated SOB, dizziness, diaphoresis, nausea/vomiting independent of activity, worsening over the last few months. Pain is a/w N/V and SOB, intermittent waxing and waning intensity throughout day, no direct association with PO intake.  She also had 1 episode of dizziness and blurry vision during this time where she almost fell, no LoC or head trauma.  Denies h/o panic attacks, fevers, chills, URI sx, abd pain, changes in urine/stool frequency or color, hematuria/hematochezia/melena, focal neuromuscular deficits. Pt had her most severe episode on 9/1/2020 AM, prompting her to call EMS. EMS gave pt Nitro SL x 2 with relief of symptoms. In [facility] ED /86. HR 77bpm (NSR). RR 18. Temp 99F. O2 sat 98% room air. Trop T neg x 1. D dimer wnl. EKG NSR 68bpm with no ischemic changes. In ED given Plavix 75mg x 1, Morphine 2mg IV x 1, and started on IV fluids NS 125cc/hr. Patient lives in a shelter, follows up regularly with PMD with evaluations for similar episodes in past with normal EKGs.  Patient has severe allergic rxn to ASA in recent years requiring epinephrine.  She also endorses MDD treated with medication, states she did not feel safe in her shelter since "I live alone and no one would know if anything happened to me", but feels safe in ED and denies SI/HI, anxiety, depression sx at this time. Pt is being admitted to [facility] for further management of chest pain with plan for Echo and CCTA in AM. 64 yo Female, shelter resident, SEV[employer], w/ PMHx TD2M, HLD, Asthma (last hospitalization 1 year ago, never intubated), Major Depressive Disorder presenting with worsening chest pain, radiating to left jaw, neck, and arm, with associated SOB, dizziness, diaphoresis, nausea/vomiting independent of activity, worsening over the last few months. Pain is a/w N/V and SOB, intermittent waxing and waning intensity throughout day, no direct association with PO intake.  She also had 1 episode of dizziness and blurry vision during this time where she almost fell, no LoC or head trauma.  Denies h/o panic attacks, fevers, chills, URI sx, abd pain, changes in urine/stool frequency or color, hematuria/hematochezia/melena, focal neuromuscular deficits. Pt had her most severe episode on 9/1/2020 AM, prompting her to call EMS. EMS gave pt Nitro SL x 2 with relief of symptoms. In [facility] ED /86. HR 77bpm (NSR). RR 18. Temp 99F. O2 sat 98% room air. Trop T neg x 1. D dimer wnl. EKG NSR 68bpm with no ischemic changes. In ED given Plavix 75mg x 1, Morphine 2mg IV x 1, and started on IV fluids NS 125cc/hr. Patient lives in a shelter, follows up regularly with PMD with evaluations for similar episodes in past with normal EKGs.  Patient has severe allergic rxn to ASA in recent years requiring epinephrine.  She also endorses MDD treated with medication, states she did not feel safe in her shelter since "I live alone and no one would know if anything happened to me", but feels safe in ED and denies SI/HI, anxiety, depression sx at this time. Pt is being admitted to [facility] for further management of chest pain with plan for Echo and CCTA in AM.

## 2020-09-01 NOTE — ED PROVIDER NOTE - PSYCHIATRIC, MLM
AAOx3, anxious mood and stable/full/reactive affect appropriate to situation, denies depressive or anxious sx, denies SI/HI at this time

## 2020-09-01 NOTE — H&P ADULT - PROBLEM SELECTOR PLAN 1
- currently chest pain free, resting comfortably but tearful and worried about recurrent episodes coming  - Trop neg x 1, f/u 8pm, 12am, and AM trop  - NPO after midnight for CCTA in AM.  - EKG NSR 68bpm with no ischemic changes.  - Pt had a Pharm NST a few years ago and was referred for CCTA but had anxiety and could not undergo test. Dr Crowe will attempt to convince patient to undergo CCTA in AM.   - Echo ordered  - CCTA ordered  - f/u hemoglobin A1C, lipid profile, TSH

## 2020-09-01 NOTE — H&P ADULT - NSICDXPASTMEDICALHX_GEN_ALL_CORE_FT
PAST MEDICAL HISTORY:  Asthma     Depression     DM (diabetes mellitus)     HLD (hyperlipidemia)     HTN (hypertension)

## 2020-09-01 NOTE — ED PROVIDER NOTE - CLINICAL SUMMARY MEDICAL DECISION MAKING FREE TEXT BOX
Patient is a 66yo female w/ PMHx TD2M, HLD, MDD presenting with acute onset chest pain w/ radiation to L neck, jaw, and UE x3d duration s/p nitro most concerning for acute MI vs. PE vs. alternate emergent cardiopulmonary etiologies vs. GERD vs. panic attack.  Patient is currently HD stable, afebrile, experiencing some chest discomfort in ED.  EKG without acute ST elevations/depression, BBBs, axis deviations, Q waves, or other pathologic changes.  Labs and imaging ordered, will monitor V/S closely, and treat with alternate antiplatelet tx (severe allergy to ASA).  Will consider further consultation/management as appropriate.

## 2020-09-01 NOTE — ED PROVIDER NOTE - OBJECTIVE STATEMENT
Patient is a 64yo female w/ PMHx TD2M, HLD, MDD presenting with acute onset chest pain w/ radiation to L neck, jaw, and UE x3d duration BIBEMS s/p nitro in the field. Patient stated she started experiencing pain in the middle of her chest with radiation to L side, no radiation to back.  Pain is a/w N/V and SOB, intermittent waxing and waning intensity throughout day, no direct association with PO intake.  She also had 1 episode of dizziness and blurry vision during this time where she almost fell, no LoC or head trauma.  She has had similar episodes in the past (1-2x/yr) but of lesser intensity.  Denies h/o panic attacks, fevers, chills, URI sx, abd pain, changes in urine/stool frequency or color, hematuria/hematochezia/melena, focal neuromuscular deficits.  Patient is adherent with T2DM and HLD regimen, no h/o smoking, EtOH or drug use.  Patient lives in a shelter, follows up regularly with PMD with evaluations for similar episodes in past with normal EKGs.  Patient has severe allergic rxn to ASA in recent years requiring epinephrine.  She also endorses MDD treated with medication, states she did not feel safe in her shelter since "I live alone and no one would know if anything happened to me", but feels safe in ED and denies SI/HI, anxiety, depression sx at this time.

## 2020-09-01 NOTE — H&P ADULT - ASSESSMENT
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 Pain is a/w N/V and SOB, intermittent waxing and waning intensity throughout day, no direct association with PO intake.  She also had 1 episode of dizziness and blurry vision during this time where she almost fell, no LoC or head trauma.  Denies h/o panic attacks, fevers, chills, URI sx, abd pain, changes in urine/stool frequency or color, hematuria/hematochezia/melena, focal neuromuscular deficits. Pt had her most severe episode on 9/1/2020 AM, prompting her to call EMS. EMS gave pt Nitro SL x 2 with relief of symptoms. In [facility] ED /86. HR 77bpm (NSR). RR 18. Temp 99F. O2 sat 98% room air. Trop T neg x 1. D dimer wnl. EKG NSR 68bpm with no ischemic changes. In ED given Plavix 75mg x 1, Morphine 2mg IV x 1, and started on IV fluids NS 125cc/hr. Patient lives in a shelter, follows up regularly with PMD with evaluations for similar episodes in past with normal EKGs.  Patient has severe allergic rxn to ASA in recent years requiring epinephrine.  She also endorses MDD treated with medication, states she did not feel safe in her shelter since "I live alone and no one would know if anything happened to me", but feels safe in ED and denies SI/HI, anxiety, depression sx at this time. Pt is being admitted to [facility] for further management of chest pain with plan for Echo and CCTA in AM. 66 yo Female, shelter resident, SEVERE [employer]ERGY, w/ PMHx TD2M, HLD,  Asthma (last hospitalization 1 year ago, never intubated), Major Depressive Disorder presenting with worsening chest pain, radiating to left jaw, neck, and arm, with associated SOB, dizziness, diaphoresis, nausea/vomiting independent of activity, worsening over the last few months. Pain is a/w N/V and SOB, intermittent waxing and waning intensity throughout day, no direct association with PO intake.  She also had 1 episode of dizziness and blurry vision during this time where she almost fell, no LoC or head trauma.  Denies h/o panic attacks, fevers, chills, URI sx, abd pain, changes in urine/stool frequency or color, hematuria/hematochezia/melena, focal neuromuscular deficits. Pt had her most severe episode on 9/1/2020 AM, prompting her to call EMS. EMS gave pt Nitro SL x 2 with relief of symptoms. In [facility] ED /86. HR 77bpm (NSR). RR 18. Temp 99F. O2 sat 98% room air. Trop T neg x 1. D dimer wnl. EKG NSR 68bpm with no ischemic changes. In ED given Plavix 75mg x 1, Morphine 2mg IV x 1, and started on IV fluids NS 125cc/hr. Patient lives in a shelter, follows up regularly with PMD with evaluations for similar episodes in past with normal EKGs.  Patient has severe allergic rxn to ASA in recent years requiring epinephrine.  She also endorses MDD treated with medication, states she did not feel safe in her shelter since "I live alone and no one would know if anything happened to me", but feels safe in ED and denies SI/HI, anxiety, depression sx at this time. Pt is being admitted to [facility] for further management of chest pain with plan for Echo and CCTA in AM.

## 2020-09-01 NOTE — ED ADULT NURSE NOTE - ED STAT RN HANDOFF DETAILS
pt admitted for further cardiac work up, remains stable. no bed available in telemetry unit. report given to RN in holding.

## 2020-09-01 NOTE — ED ADULT TRIAGE NOTE - HEIGHT IN INCHES
Occupational Therapy   Treatment    Name: Zeyad Woodard  MRN: [id number]  Admitting Diagnosis:  HCAP (healthcare-associated pneumonia)       Recommendations:     Discharge Recommendations:  (TBD)  Discharge Equipment Recommendations:  none  Barriers to discharge:  None    Subjective     Communicated with: nurseDunia prior to session.  Pain/Comfort:  · Pain Rating 1: 0/10  · Pain Rating Post-Intervention 1: 0/10    Patients cultural, spiritual, Mosque conflicts given the current situation:  (none reported)    Objective:     Patient found with: telemetry, peripheral IV    General Precautions: Standard, aspiration, fall, NPO   Orthopedic Precautions:N/A   Braces: N/A     Bed Mobility:    · Patient completed Rolling/Turning to Left with  maximal assistance and with side rail  · Patient completed Scooting/Bridging with total assistance  · Patient completed Supine to Sit with total assistance  · Patient completed Sit to Supine with total assistance     Functional Mobility/Transfers:  · Patient completed Sit <> Stand Transfer with maximal assistance and of 2 persons  with  no assistive device     Activities of Daily Living:  · Grooming: stand by assistance    · Toileting: total assistance  incontinent    Patient left HOB elevated with all lines intact, call button in reach, bed alarm on, RN notified and daughters present    [facility] 6 Click:  [facility] Total Score: 9    Treatment & Education:  Bed mob as noted above. Sat EOB x 15 mins with SBA-CGA. See PT note for therex. Patient used oral swab and washed face with SBA. Sit <> stand with Min (A) of 2 x 20 seconds with max (A) to maintain upright.   Education:    Assessment:   Patient with good EOB tolerance and alertness this date. Will benefit from continued skilled OT to address functional deficits.     Zeyad Woodard is a 89 y.o. male with a medical diagnosis of HCAP (healthcare-associated pneumonia).  Performance deficits affecting function are weakness, impaired  endurance, impaired self care skills, impaired functional mobilty, gait instability, impaired balance, pain, impaired skin, edema, decreased coordination, decreased safety awareness, decreased lower extremity function, decreased upper extremity function.      Rehab Prognosis:  Fair; patient would benefit from acute skilled OT services to address these deficits and reach maximum level of function.       Plan:     Patient to be seen 5 x/week to address the above listed problems via self-care/home management, therapeutic activities, therapeutic exercises  · Plan of Care Expires: 05/06/18  · Plan of Care Reviewed with: patient, family    This Plan of care has been discussed with the patient who was involved in its development and understands and is in agreement with the identified goals and treatment plan    GOALS:    Occupational Therapy Goals        Problem: Occupational Therapy Goal    Goal Priority Disciplines Outcome Interventions   Occupational Therapy Goal     OT, PT/OT Ongoing (interventions implemented as appropriate)    Description:  Goals to be met by: 05/06/18     Patient will increase functional independence with ADLs by performing:    Grooming while seated with Set-up Assistance.  Toileting from bedside commode with Moderate Assistance for hygiene and clothing management.   Supine to sit with Stand-by Assistance and use of bedrail as needed.  Toilet transfer to bedside commode with Contact Guard Assistance.  Increased functional strength to WFL for ADLs.                      Time Tracking:     OT Date of Treatment: 04/17/18  OT Start Time: 1024  OT Stop Time: 1050  OT Total Time (min): 26 mins with PTA    Billable Minutes:Self Care/Home Management 13    MADDY Mendoza  4/17/2018     5

## 2020-09-01 NOTE — ED PROVIDER NOTE - CONSTITUTIONAL, MLM
normal... AAOx3, patient appears nervous and in mild discomfort, appears stated age, no other apparent distress

## 2020-09-01 NOTE — H&P ADULT - PROBLEM SELECTOR PLAN 4
- lungs CTA b/l  - last hospitalization 1 year ago, never intubated  - Continue Albuterol nebs and Spiriva 160mg-4.5mg 2 puffs BID

## 2020-09-01 NOTE — H&P ADULT - PROBLEM SELECTOR PLAN 5
- pt tearful and states she does not feel comfortable living in her shelter  - Pt denies SI/HI  - pt attempted to contact her  and is upset she did not call her back  - Consult SW in AM     VTE PPX: Lovenox  Dispo: pending clinical progression

## 2020-09-01 NOTE — ED ADULT NURSE NOTE - OBJECTIVE STATEMENT
66 y/o female BIBA c/o chest pain and SOB x 4 days. pt was at the clinic and sent here for cardiac evaluation.

## 2020-09-02 DIAGNOSIS — R63.8 OTHER SYMPTOMS AND SIGNS CONCERNING FOOD AND FLUID INTAKE: ICD-10-CM

## 2020-09-02 DIAGNOSIS — R13.10 DYSPHAGIA, UNSPECIFIED: ICD-10-CM

## 2020-09-02 DIAGNOSIS — E11.9 TYPE 2 DIABETES MELLITUS WITHOUT COMPLICATIONS: ICD-10-CM

## 2020-09-02 DIAGNOSIS — F32.9 MAJOR DEPRESSIVE DISORDER, SINGLE EPISODE, UNSPECIFIED: ICD-10-CM

## 2020-09-02 DIAGNOSIS — R07.89 OTHER CHEST PAIN: ICD-10-CM

## 2020-09-02 LAB
A1C WITH ESTIMATED AVERAGE GLUCOSE RESULT: 9.9 % — HIGH (ref 4–5.6)
ANION GAP SERPL CALC-SCNC: 9 MMOL/L — SIGNIFICANT CHANGE UP (ref 5–17)
BUN SERPL-MCNC: 17 MG/DL — SIGNIFICANT CHANGE UP (ref 7–23)
CALCIUM SERPL-MCNC: 8.4 MG/DL — SIGNIFICANT CHANGE UP (ref 8.4–10.5)
CHLORIDE SERPL-SCNC: 105 MMOL/L — SIGNIFICANT CHANGE UP (ref 96–108)
CHOLEST SERPL-MCNC: 212 MG/DL — HIGH (ref 10–199)
CK MB CFR SERPL CALC: 1.2 NG/ML — SIGNIFICANT CHANGE UP (ref 0–6.7)
CK MB CFR SERPL CALC: 1.4 NG/ML — SIGNIFICANT CHANGE UP (ref 0–6.7)
CK SERPL-CCNC: 48 U/L — SIGNIFICANT CHANGE UP (ref 25–170)
CK SERPL-CCNC: 53 U/L — SIGNIFICANT CHANGE UP (ref 25–170)
CO2 SERPL-SCNC: 25 MMOL/L — SIGNIFICANT CHANGE UP (ref 22–31)
CREAT SERPL-MCNC: 0.72 MG/DL — SIGNIFICANT CHANGE UP (ref 0.5–1.3)
ESTIMATED AVERAGE GLUCOSE: 237 MG/DL — HIGH (ref 68–114)
GLUCOSE BLDC GLUCOMTR-MCNC: 143 MG/DL — HIGH (ref 70–99)
GLUCOSE BLDC GLUCOMTR-MCNC: 144 MG/DL — HIGH (ref 70–99)
GLUCOSE BLDC GLUCOMTR-MCNC: 190 MG/DL — HIGH (ref 70–99)
GLUCOSE BLDC GLUCOMTR-MCNC: 258 MG/DL — HIGH (ref 70–99)
GLUCOSE SERPL-MCNC: 262 MG/DL — HIGH (ref 70–99)
HCT VFR BLD CALC: 32 % — LOW (ref 34.5–45)
HCV AB S/CO SERPL IA: 0.09 S/CO — SIGNIFICANT CHANGE UP
HCV AB SERPL-IMP: SIGNIFICANT CHANGE UP
HDLC SERPL-MCNC: 30 MG/DL — LOW
HGB BLD-MCNC: 10.1 G/DL — LOW (ref 11.5–15.5)
HIV 1+2 AB+HIV1 P24 AG SERPL QL IA: SIGNIFICANT CHANGE UP
LIPID PNL WITH DIRECT LDL SERPL: 105 MG/DL — HIGH
MAGNESIUM SERPL-MCNC: 1.6 MG/DL — SIGNIFICANT CHANGE UP (ref 1.6–2.6)
MCHC RBC-ENTMCNC: 28.1 PG — SIGNIFICANT CHANGE UP (ref 27–34)
MCHC RBC-ENTMCNC: 31.6 GM/DL — LOW (ref 32–36)
MCV RBC AUTO: 89.1 FL — SIGNIFICANT CHANGE UP (ref 80–100)
NRBC # BLD: 0 /100 WBCS — SIGNIFICANT CHANGE UP (ref 0–0)
PLATELET # BLD AUTO: 157 K/UL — SIGNIFICANT CHANGE UP (ref 150–400)
POTASSIUM SERPL-MCNC: 4.6 MMOL/L — SIGNIFICANT CHANGE UP (ref 3.5–5.3)
POTASSIUM SERPL-SCNC: 4.6 MMOL/L — SIGNIFICANT CHANGE UP (ref 3.5–5.3)
RBC # BLD: 3.59 M/UL — LOW (ref 3.8–5.2)
RBC # FLD: 12.5 % — SIGNIFICANT CHANGE UP (ref 10.3–14.5)
SODIUM SERPL-SCNC: 139 MMOL/L — SIGNIFICANT CHANGE UP (ref 135–145)
TOTAL CHOLESTEROL/HDL RATIO MEASUREMENT: 7.1 RATIO — SIGNIFICANT CHANGE UP (ref 3.3–7.1)
TRIGL SERPL-MCNC: 384 MG/DL — HIGH (ref 10–149)
TROPONIN T SERPL-MCNC: <0.01 NG/ML — SIGNIFICANT CHANGE UP (ref 0–0.01)
TROPONIN T SERPL-MCNC: <0.01 NG/ML — SIGNIFICANT CHANGE UP (ref 0–0.01)
TSH SERPL-MCNC: 1.58 UIU/ML — SIGNIFICANT CHANGE UP (ref 0.35–4.94)
WBC # BLD: 5.88 K/UL — SIGNIFICANT CHANGE UP (ref 3.8–10.5)
WBC # FLD AUTO: 5.88 K/UL — SIGNIFICANT CHANGE UP (ref 3.8–10.5)

## 2020-09-02 PROCEDURE — 93306 TTE W/DOPPLER COMPLETE: CPT | Mod: 26

## 2020-09-02 PROCEDURE — 99223 1ST HOSP IP/OBS HIGH 75: CPT

## 2020-09-02 PROCEDURE — 75574 CT ANGIO HRT W/3D IMAGE: CPT | Mod: 26

## 2020-09-02 PROCEDURE — 93010 ELECTROCARDIOGRAM REPORT: CPT

## 2020-09-02 PROCEDURE — 99222 1ST HOSP IP/OBS MODERATE 55: CPT | Mod: GC

## 2020-09-02 PROCEDURE — 99233 SBSQ HOSP IP/OBS HIGH 50: CPT

## 2020-09-02 RX ORDER — ATORVASTATIN CALCIUM 80 MG/1
40 TABLET, FILM COATED ORAL AT BEDTIME
Refills: 0 | Status: DISCONTINUED | OUTPATIENT
Start: 2020-09-02 | End: 2020-09-04

## 2020-09-02 RX ORDER — ACETAMINOPHEN 500 MG
650 TABLET ORAL ONCE
Refills: 0 | Status: COMPLETED | OUTPATIENT
Start: 2020-09-02 | End: 2020-09-02

## 2020-09-02 RX ORDER — INSULIN LISPRO 100/ML
5 VIAL (ML) SUBCUTANEOUS
Refills: 0 | Status: DISCONTINUED | OUTPATIENT
Start: 2020-09-02 | End: 2020-09-03

## 2020-09-02 RX ORDER — INSULIN GLARGINE 100 [IU]/ML
50 INJECTION, SOLUTION SUBCUTANEOUS AT BEDTIME
Refills: 0 | Status: DISCONTINUED | OUTPATIENT
Start: 2020-09-02 | End: 2020-09-02

## 2020-09-02 RX ORDER — SODIUM CHLORIDE 9 MG/ML
1000 INJECTION INTRAMUSCULAR; INTRAVENOUS; SUBCUTANEOUS
Refills: 0 | Status: DISCONTINUED | OUTPATIENT
Start: 2020-09-02 | End: 2020-09-03

## 2020-09-02 RX ORDER — INSULIN GLARGINE 100 [IU]/ML
15 INJECTION, SOLUTION SUBCUTANEOUS AT BEDTIME
Refills: 0 | Status: DISCONTINUED | OUTPATIENT
Start: 2020-09-02 | End: 2020-09-03

## 2020-09-02 RX ADMIN — Medication 0.5 INCH(S): at 13:30

## 2020-09-02 RX ADMIN — Medication 6: at 07:21

## 2020-09-02 RX ADMIN — Medication 0.5 INCH(S): at 00:18

## 2020-09-02 RX ADMIN — ENOXAPARIN SODIUM 40 MILLIGRAM(S): 100 INJECTION SUBCUTANEOUS at 22:04

## 2020-09-02 RX ADMIN — Medication 2: at 22:03

## 2020-09-02 RX ADMIN — BUDESONIDE AND FORMOTEROL FUMARATE DIHYDRATE 2 PUFF(S): 160; 4.5 AEROSOL RESPIRATORY (INHALATION) at 22:05

## 2020-09-02 RX ADMIN — KETOTIFEN FUMARATE 1 DROP(S): 0.34 SOLUTION OPHTHALMIC at 22:26

## 2020-09-02 RX ADMIN — Medication 650 MILLIGRAM(S): at 04:40

## 2020-09-02 RX ADMIN — ATORVASTATIN CALCIUM 40 MILLIGRAM(S): 80 TABLET, FILM COATED ORAL at 22:04

## 2020-09-02 RX ADMIN — SODIUM CHLORIDE 125 MILLILITER(S): 9 INJECTION INTRAMUSCULAR; INTRAVENOUS; SUBCUTANEOUS at 20:45

## 2020-09-02 RX ADMIN — Medication 650 MILLIGRAM(S): at 03:43

## 2020-09-02 RX ADMIN — BUDESONIDE AND FORMOTEROL FUMARATE DIHYDRATE 2 PUFF(S): 160; 4.5 AEROSOL RESPIRATORY (INHALATION) at 10:53

## 2020-09-02 RX ADMIN — INSULIN GLARGINE 15 UNIT(S): 100 INJECTION, SOLUTION SUBCUTANEOUS at 22:26

## 2020-09-02 NOTE — PROGRESS NOTE ADULT - SUBJECTIVE AND OBJECTIVE BOX
Hospital Course  66 yo Female, shelter resident w/ PMHx DMII HLD, Asthma (last hospitalization 1 year ago, never intubated), Major Depressive Disorder, and severe ASA allergy who presented to the ED on 9/1/20 with reports of chest pain radiating to left jaw, neck, and arm, with associated SOB, dizziness, diaphoresis, nausea/vomiting independent of activity, worsening over the last few months. Trop neg x 3. EKG NSR 68bpm with no ischemic changes. In ED given Plavix 75mg x 1, Morphine 2mg IV x 1 and started on IV fluids NS 125cc/hr. Pt initially admitted to [facility] for further management of chest pain and to r/o ACS. Troponin negative x 3, CCTA 9/2/20 revealed non-obstructive coronary artery disease. TTE 9/2/20 revealed no cardiac pathology with an EF 55%. VSS, no events on telemetry. Endocrine consulted for recommendations on diabetes management. Pt with HgbA1c of 9.9. SW consulted to evaluate pt's home situation as she lives in a shelter and reported feeling unsafe. Pt endorsed pain and difficulty swallowing liquids and solids. Reports history of GERD but does not take medications for it. Pt stable from a cardiac standpoint and transferred to the [facility] for further evaluation of dysphagia/odynophagia. Of note, patient says she feels unsafe at her shelter due to COVID and really doesn't want to go back there.     SUBJECTIVE:    Pt seen and examined at bedside. States chest pain still present and has pain with solid and liquid intake. Denies fevers, sob, n/v, dizziness, palpitations, diarrhea, cough.        PHYSICAL EXAM:  Vital Signs Last 24 Hrs  T(C): 36.7 (02 Sep 2020 13:00), Max: 37.2 (01 Sep 2020 18:54)  T(F): 98 (02 Sep 2020 13:00), Max: 99 (01 Sep 2020 18:54)  HR: 67 (02 Sep 2020 15:00) (62 - 77)  BP: 130/64 (02 Sep 2020 15:00) (103/54 - 138/86)  BP(mean): 103 (01 Sep 2020 18:54) (103 - 103)  RR: 18 (02 Sep 2020 15:00) (18 - 18)  SpO2: 97% (02 Sep 2020 15:00) (95% - 99%)    General: NAD, laying in bed, well appearing, alert, interactive  HEENT: PERRLA, no scleral icterus, no JVD, no thyromegaly  Cardio: +S1/S2, RRR, no murmurs  Resp: CTA b/l, no w/r/r  GI: +BSx4, soft, NT/ND  Extremities: no edema, clubbing, cyanosis  Vasc: 3+ peripheral pulses  Skin: No rashes  Neuro: AAOx3, CN II-XII grossly intact, no focal deficits      MEDICATIONS  (STANDING):  atorvastatin 40 milliGRAM(s) Oral at bedtime  budesonide 160 MICROgram(s)/formoterol 4.5 MICROgram(s) Inhaler 2 Puff(s) Inhalation two times a day  dextrose 5%. 1000 milliLiter(s) (50 mL/Hr) IV Continuous <Continuous>  dextrose 50% Injectable 12.5 Gram(s) IV Push once  dextrose 50% Injectable 25 Gram(s) IV Push once  dextrose 50% Injectable 25 Gram(s) IV Push once  enoxaparin Injectable 40 milliGRAM(s) SubCutaneous every 24 hours  influenza   Vaccine 0.5 milliLiter(s) IntraMuscular once  insulin glargine Injectable (LANTUS) 15 Unit(s) SubCutaneous at bedtime  insulin lispro (HumaLOG) corrective regimen sliding scale   SubCutaneous Before meals and at bedtime  insulin lispro Injectable (HumaLOG) 5 Unit(s) SubCutaneous three times a day before meals  ketotifen 0.025% Ophthalmic Solution 1 Drop(s) Both EYES daily  polyethylene glycol 3350 17 Gram(s) Oral daily  sertraline 50 milliGRAM(s) Oral daily    MEDICATIONS  (PRN):  dextrose 40% Gel 15 Gram(s) Oral once PRN Blood Glucose LESS THAN 70 milliGRAM(s)/deciliter  glucagon  Injectable 1 milliGRAM(s) IntraMuscular once PRN Glucose LESS THAN 70 milligrams/deciliter        Allergies    aspirin (Rash)  penicillin (Rash)    Intolerances          LABS:                        10.1   5.88  )-----------( 157      ( 02 Sep 2020 06:07 )             32.0     09-02    139  |  105  |  17  ----------------------------<  262<H>  4.6   |  25  |  0.72    Ca    8.4      02 Sep 2020 06:07  Mg     1.6     09-02    TPro  7.9  /  Alb  4.6  /  TBili  0.5  /  DBili  x   /  AST  17  /  ALT  19  /  AlkPhos  67  09-01    PT/INR - ( 01 Sep 2020 15:33 )   PT: 11.9 sec;   INR: 0.99          PTT - ( 01 Sep 2020 15:33 )  PTT:28.2 sec  Fingerstick  glucose: POCT Blood Glucose.: 144 mg/dL (02 Sep 2020 16:55)        RADIOLOGY & ADDITIONAL TESTS: Reviewed. Hospital Course  64 yo Female, shelter resident w/ PMHx DMII HLD, Asthma (last hospitalization 1 year ago, never intubated), Major Depressive Disorder, and severe ASA allergy who presented to the ED on 9/1/20 with reports of chest pain radiating to left jaw, neck, and arm, with associated SOB, dizziness, diaphoresis, nausea/vomiting independent of activity, worsening over the last few months. Trop neg x 3. EKG NSR 68bpm with no ischemic changes. In ED given Plavix 75mg x 1, Morphine 2mg IV x 1 and started on IV fluids NS 125cc/hr. Pt initially admitted to [facility] for further management of chest pain and to r/o ACS. CCTA 9/2/20 revealed non-obstructive coronary artery disease. TTE 9/2/20 revealed no cardiac pathology with an EF 55%. VSS, no events on telemetry. Endocrine consulted for recommendations on diabetes management. Pt with HgbA1c of 9.9. SW consulted to evaluate pt's home situation as she lives in a shelter and reported feeling unsafe. Pt endorsed pain and difficulty swallowing liquids and solids. Reports history of GERD but does not take medications for it. Pt stable from a cardiac standpoint and transferred to the [facility] for further evaluation of dysphagia/odynophagia. Of note, patient says she feels unsafe at her shelter due to COVID and really doesn't want to go back there.     SUBJECTIVE:    Pt seen and examined at bedside. States chest pain still present and has pain with solid and liquid intake. Denies fevers, sob, n/v, dizziness, palpitations, diarrhea, cough.        PHYSICAL EXAM:  Vital Signs Last 24 Hrs  T(C): 36.7 (02 Sep 2020 13:00), Max: 37.2 (01 Sep 2020 18:54)  T(F): 98 (02 Sep 2020 13:00), Max: 99 (01 Sep 2020 18:54)  HR: 67 (02 Sep 2020 15:00) (62 - 77)  BP: 130/64 (02 Sep 2020 15:00) (103/54 - 138/86)  BP(mean): 103 (01 Sep 2020 18:54) (103 - 103)  RR: 18 (02 Sep 2020 15:00) (18 - 18)  SpO2: 97% (02 Sep 2020 15:00) (95% - 99%)    General: NAD, laying in bed, well appearing, alert, interactive  HEENT: PERRLA, no scleral icterus, no JVD, no thyromegaly  Cardio: +S1/S2, RRR, no murmurs  Resp: CTA b/l, no w/r/r  GI: +BSx4, soft, NT/ND  Extremities: no edema, clubbing, cyanosis  Vasc: 3+ peripheral pulses  Skin: No rashes  Neuro: AAOx3, CN II-XII grossly intact, no focal deficits      MEDICATIONS  (STANDING):  atorvastatin 40 milliGRAM(s) Oral at bedtime  budesonide 160 MICROgram(s)/formoterol 4.5 MICROgram(s) Inhaler 2 Puff(s) Inhalation two times a day  dextrose 5%. 1000 milliLiter(s) (50 mL/Hr) IV Continuous <Continuous>  dextrose 50% Injectable 12.5 Gram(s) IV Push once  dextrose 50% Injectable 25 Gram(s) IV Push once  dextrose 50% Injectable 25 Gram(s) IV Push once  enoxaparin Injectable 40 milliGRAM(s) SubCutaneous every 24 hours  influenza   Vaccine 0.5 milliLiter(s) IntraMuscular once  insulin glargine Injectable (LANTUS) 15 Unit(s) SubCutaneous at bedtime  insulin lispro (HumaLOG) corrective regimen sliding scale   SubCutaneous Before meals and at bedtime  insulin lispro Injectable (HumaLOG) 5 Unit(s) SubCutaneous three times a day before meals  ketotifen 0.025% Ophthalmic Solution 1 Drop(s) Both EYES daily  polyethylene glycol 3350 17 Gram(s) Oral daily  sertraline 50 milliGRAM(s) Oral daily    MEDICATIONS  (PRN):  dextrose 40% Gel 15 Gram(s) Oral once PRN Blood Glucose LESS THAN 70 milliGRAM(s)/deciliter  glucagon  Injectable 1 milliGRAM(s) IntraMuscular once PRN Glucose LESS THAN 70 milligrams/deciliter        Allergies    aspirin (Rash)  penicillin (Rash)    Intolerances          LABS:                        10.1   5.88  )-----------( 157      ( 02 Sep 2020 06:07 )             32.0     09-02    139  |  105  |  17  ----------------------------<  262<H>  4.6   |  25  |  0.72    Ca    8.4      02 Sep 2020 06:07  Mg     1.6     09-02    TPro  7.9  /  Alb  4.6  /  TBili  0.5  /  DBili  x   /  AST  17  /  ALT  19  /  AlkPhos  67  09-01    PT/INR - ( 01 Sep 2020 15:33 )   PT: 11.9 sec;   INR: 0.99          PTT - ( 01 Sep 2020 15:33 )  PTT:28.2 sec  Fingerstick  glucose: POCT Blood Glucose.: 144 mg/dL (02 Sep 2020 16:55)        RADIOLOGY & ADDITIONAL TESTS: Reviewed.

## 2020-09-02 NOTE — PROVIDER CONTACT NOTE (OTHER) - SITUATION
attempted speech and swallow by bedside. Patient reports pain when swallowing and previously stated that she feels like she's "choking" when she eats

## 2020-09-02 NOTE — CONSULT NOTE ADULT - SUBJECTIVE AND OBJECTIVE BOX
****INCOMPLETE****    64 yo Female, shelter resident, SEVERE [employer]IRIN [employer], w/ PMHx DM II, HLD, Asthma (last hospitalization 1 year ago, never intubated), Major Depressive Disorder who presented to the ED 9/1/20 with reports of chest pain radiating to left jaw, neck and arm with associated SOB, dizziness, diaphoresis, N/V independent of activity, worsening over the last few months. Trop neg x 3. EKG NSR 68bpm with no ischemic changes. In ED given Plavix 75mg x 1, Morphine 2mg IV x 1 and started on IV fluids NS 125cc/hr. Pt initially admitted to [facility] for further management of chest pain with plan for ECHO and CCTA, both of which were negative. Patient now reporting dysphagia with associated chest pain of variable intensity x1 week, made worse with food, and is worse at night. Pt reports that she think she have a h/o GERD but doesn't take any meds for it. She denies experiencing a sour taste in her mouth. ROS otherwise negative for fever, chills, SOB, n/v/c/d, numbness, or tingling.      PAST MEDICAL/SURGICAL HISTORY  PAST MEDICAL & SURGICAL HISTORY:  HTN (hypertension)  Depression  HLD (hyperlipidemia)  DM (diabetes mellitus)  Asthma      REVIEW OF SYSTEMS:  CONSTITUTIONAL: No fever, weight loss, or fatigue  EYES: No eye pain, visual disturbances, or discharge  ENMT:  No difficulty hearing, tinnitus, vertigo; No sinus or throat pain  NECK: No pain or stiffness  BREASTS: No pain, masses, or nipple discharge  RESPIRATORY: No cough, wheezing, chills or hemoptysis; No shortness of breath  CARDIOVASCULAR: No chest pain, palpitations, dizziness, or leg swelling  GASTROINTESTINAL: No abdominal or epigastric pain. No nausea, vomiting, or hematemesis; No diarrhea or constipation. No melena or hematochezia.  GENITOURINARY: No dysuria, frequency, hematuria, or incontinence  NEUROLOGICAL: No headaches, memory loss, loss of strength, numbness, or tremors  SKIN: No itching, burning, rashes, or lesions   LYMPH NODES: No enlarged glands  ENDOCRINE: No heat or cold intolerance; No hair loss  MUSCULOSKELETAL: No joint pain or swelling; No muscle, back, or extremity pain  PSYCHIATRIC: No depression, anxiety, mood swings, or difficulty sleeping  HEME/LYMPH: No easy bruising, or bleeding gums  ALLERY AND IMMUNOLOGIC: No hives or eczema    T(C): 36.7 (09-02-20 @ 13:00), Max: 37.2 (09-01-20 @ 18:54)  HR: 67 (09-02-20 @ 15:00) (60 - 77)  BP: 130/64 (09-02-20 @ 15:00) (103/54 - 138/86)  RR: 18 (09-02-20 @ 15:00) (16 - 18)  SpO2: 97% (09-02-20 @ 15:00) (95% - 99%)  Wt(kg): --Vital Signs Last 24 Hrs  T(C): 36.7 (02 Sep 2020 13:00), Max: 37.2 (01 Sep 2020 18:54)  T(F): 98 (02 Sep 2020 13:00), Max: 99 (01 Sep 2020 18:54)  HR: 67 (02 Sep 2020 15:00) (60 - 77)  BP: 130/64 (02 Sep 2020 15:00) (103/54 - 138/86)  BP(mean): 103 (01 Sep 2020 18:54) (103 - 103)  RR: 18 (02 Sep 2020 15:00) (16 - 18)  SpO2: 97% (02 Sep 2020 15:00) (95% - 99%)    PHYSICAL EXAM:  GENERAL: NAD, well-groomed, well-developed  HEAD:  Atraumatic, Normocephalic  EYES: EOMI, PERRLA, conjunctiva and sclera clear  ENMT: No tonsillar erythema, exudates, or enlargement; Moist mucous membranes, Good dentition, No lesions  NECK: Supple, No JVD, Normal thyroid  NERVOUS SYSTEM:  Alert & Oriented X3, Good concentration; Motor Strength 5/5 B/L upper and lower extremities; DTRs 2+ intact and symmetric  CHEST/LUNG: Clear to percussion bilaterally; No rales, rhonchi, wheezing, or rubs  HEART: Regular rate and rhythm; No murmurs, rubs, or gallops  ABDOMEN: Soft, Nontender, Nondistended; Bowel sounds present  EXTREMITIES:  2+ Peripheral Pulses, No clubbing, cyanosis, or edema  LYMPH: No lymphadenopathy noted  SKIN: No rashes or lesions    Consultant(s) Notes Reviewed:  [x ] YES  [ ] NO  Care Discussed with Consultants/Other Providers [ x] YES  [ ] NO    LABS:  CBC   09-02-20 @ 06:07  Hematcorit 32.0  Hemoglobin 10.1  Mean Cell Hemoglobin 28.1  Platelet Count-Automated 157  RBC Count 3.59  Red Cell Distrib Width 12.5  Wbc Count 5.88      BMP  09-02-20 @ 06:07  Anion Gap. Serum 9  Blood Urea Nitrogen,Serm 17  Calcium, Total Serum 8.4  Carbon Dioxide, Serum 25  Chloride, Serum 105  Creatinine, Serum 0.72  eGFR in  101  eGFR in Non Afican American 87  Gloucose, serum 262  Potassium, Serum 4.6  Sodium, Serum 139              09-01-20 @ 15:32  Anion Gap. Serum 11  Blood Urea Nitrogen,Serm 19  Calcium, Total Serum 9.8  Carbon Dioxide, Serum 26  Chloride, Serum 101  Creatinine, Serum 0.67  eGFR in  106  eGFR in Non Afican American 92  Gloucose, serum 165  Potassium, Serum 4.3  Sodium, Serum 138                  CMP  09-02-20 @ 06:07  Maia Aminotransferase(ALT/SGPT)--  Albumin, Serum --  Alkaline Phosphatase, Serum --  Anion Gap, Serum 9  Aspartate Aminotransferase (AST/SGOT)--  Bilirubin Total, Serum --  Blood Urea Nitrogen, Serum 17  Calcium,Total Serum 8.4  Carbon Dioxide, Serum 25  Chloride, Serum 105  Creatinine, Serum 0.72  eGFR if  101  eGFR if Non African American 87  Glucose, Serum 262  Potassium, Serum 4.6  Protein Total, Serum --  Sodium, Serum 139                      09-01-20 @ 15:32  Maia Aminotransferase(ALT/SGPT)19  Albumin, Serum 4.6  Alkaline Phosphatase, Serum 67  Anion Gap, Serum 11  Aspartate Aminotransferase (AST/SGOT)17  Bilirubin Total, Serum 0.5  Blood Urea Nitrogen, Serum 19  Calcium,Total Serum 9.8  Carbon Dioxide, Serum 26  Chloride, Serum 101  Creatinine, Serum 0.67  eGFR if  106  eGFR if Non African American 92  Glucose, Serum 165  Potassium, Serum 4.3  Protein Total, Serum 7.9  Sodium, Serum 138                          PT/INR  PT/INR  09-01-20 @ 15:33  INR 0.99  Prothrombin Time Comment --  Prothrobin Time, [id number]      Amylase/Lipase            RADIOLOGY & ADDITIONAL TESTS:    Imaging Personally Reviewed:  [ ] YES  [ ] NO 66 yo Female, shelter resident, SEVERE ASPIRIN ALLERGY, w/ PMHx DM II, HLD, Asthma (last hospitalization 1 year ago, never intubated), Major Depressive Disorder who presented to the ED 9/1/20 with reports of chest pain radiating to left jaw, neck and arm with associated SOB, dizziness, diaphoresis, N/V independent of activity, worsening over the last few months. Trop neg x 3. EKG NSR 68bpm with no ischemic changes. In ED given Plavix 75mg x 1, Morphine 2mg IV x 1 and started on IV fluids NS 125cc/hr. Pt initially admitted to [facility] for further management of chest pain with plan for ECHO and CCTA, both of which were negative. Patient now reporting dysphagia with associated chest pain of variable intensity x1 week, made worse with food, and is worse at night. Pt reports that she think she have a h/o GERD but doesn't take any meds for it. She denies experiencing a sour taste in her mouth. ROS otherwise negative for fever, chills, SOB, n/v/c/d, numbness, or tingling. Of note, patient says she feels unsafe at her shelter due to COVID and really doesn't want to go back there.       PAST MEDICAL/SURGICAL HISTORY  PAST MEDICAL & SURGICAL HISTORY:  HTN (hypertension)  Depression  HLD (hyperlipidemia)  DM (diabetes mellitus)  Asthma      REVIEW OF SYSTEMS:  CONSTITUTIONAL: No fever, weight loss, or fatigue  EYES: No eye pain, visual disturbances, or discharge  ENMT:  No difficulty hearing, tinnitus, vertigo; No sinus or throat pain  NECK: No pain or stiffness  BREASTS: No pain, masses, or nipple discharge  RESPIRATORY: No cough, wheezing, chills or hemoptysis; No shortness of breath  CARDIOVASCULAR: No chest pain, palpitations, dizziness, or leg swelling  GASTROINTESTINAL: No abdominal or epigastric pain. No nausea, vomiting, or hematemesis; No diarrhea or constipation. No melena or hematochezia.  GENITOURINARY: No dysuria, frequency, hematuria, or incontinence  NEUROLOGICAL: No headaches, memory loss, loss of strength, numbness, or tremors  SKIN: No itching, burning, rashes, or lesions   LYMPH NODES: No enlarged glands  ENDOCRINE: No heat or cold intolerance; No hair loss  MUSCULOSKELETAL: No joint pain or swelling; No muscle, back, or extremity pain  PSYCHIATRIC: No depression, anxiety, mood swings, or difficulty sleeping  HEME/LYMPH: No easy bruising, or bleeding gums  ALLERY AND IMMUNOLOGIC: No hives or eczema    T(C): 36.7 (09-02-20 @ 13:00), Max: 37.2 (09-01-20 @ 18:54)  HR: 67 (09-02-20 @ 15:00) (60 - 77)  BP: 130/64 (09-02-20 @ 15:00) (103/54 - 138/86)  RR: 18 (09-02-20 @ 15:00) (16 - 18)  SpO2: 97% (09-02-20 @ 15:00) (95% - 99%)  Wt(kg): --Vital Signs Last 24 Hrs  T(C): 36.7 (02 Sep 2020 13:00), Max: 37.2 (01 Sep 2020 18:54)  T(F): 98 (02 Sep 2020 13:00), Max: 99 (01 Sep 2020 18:54)  HR: 67 (02 Sep 2020 15:00) (60 - 77)  BP: 130/64 (02 Sep 2020 15:00) (103/54 - 138/86)  BP(mean): 103 (01 Sep 2020 18:54) (103 - 103)  RR: 18 (02 Sep 2020 15:00) (16 - 18)  SpO2: 97% (02 Sep 2020 15:00) (95% - 99%)    PHYSICAL EXAM:  GENERAL: NAD, well-groomed, well-developed  HEAD:  Atraumatic, Normocephalic  EYES: EOMI, PERRLA, conjunctiva and sclera clear  ENMT: No tonsillar erythema, exudates, or enlargement; Moist mucous membranes, Good dentition, No lesions  NECK: Supple, No JVD, Normal thyroid  NERVOUS SYSTEM:  Alert & Oriented X3, Good concentration; Motor Strength 5/5 B/L upper and lower extremities; DTRs 2+ intact and symmetric  CHEST/LUNG: Clear to percussion bilaterally; No rales, rhonchi, wheezing, or rubs  HEART: Regular rate and rhythm; No murmurs, rubs, or gallops  ABDOMEN: Soft, Nontender, Nondistended; Bowel sounds present  EXTREMITIES:  2+ Peripheral Pulses, No clubbing, cyanosis, or edema  LYMPH: No lymphadenopathy noted  SKIN: No rashes or lesions    Consultant(s) Notes Reviewed:  [x ] YES  [ ] NO  Care Discussed with Consultants/Other Providers [ x] YES  [ ] NO    LABS:  CBC   09-02-20 @ 06:07  Hematcorit 32.0  Hemoglobin 10.1  Mean Cell Hemoglobin 28.1  Platelet Count-Automated 157  RBC Count 3.59  Red Cell Distrib Width 12.5  Wbc Count 5.88      BMP  09-02-20 @ 06:07  Anion Gap. Serum 9  Blood Urea Nitrogen,Serm 17  Calcium, Total Serum 8.4  Carbon Dioxide, Serum 25  Chloride, Serum 105  Creatinine, Serum 0.72  eGFR in  101  eGFR in Non Afican American 87  Gloucose, serum 262  Potassium, Serum 4.6  Sodium, Serum 139              09-01-20 @ 15:32  Anion Gap. Serum 11  Blood Urea Nitrogen,Serm 19  Calcium, Total Serum 9.8  Carbon Dioxide, Serum 26  Chloride, Serum 101  Creatinine, Serum 0.67  eGFR in  106  eGFR in Non Afican American 92  Gloucose, serum 165  Potassium, Serum 4.3  Sodium, Serum 138                  CMP  09-02-20 @ 06:07  Maia Aminotransferase(ALT/SGPT)--  Albumin, Serum --  Alkaline Phosphatase, Serum --  Anion Gap, Serum 9  Aspartate Aminotransferase (AST/SGOT)--  Bilirubin Total, Serum --  Blood Urea Nitrogen, Serum 17  Calcium,Total Serum 8.4  Carbon Dioxide, Serum 25  Chloride, Serum 105  Creatinine, Serum 0.72  eGFR if  101  eGFR if Non African American 87  Glucose, Serum 262  Potassium, Serum 4.6  Protein Total, Serum --  Sodium, Serum 139                      09-01-20 @ 15:32  Maia Aminotransferase(ALT/SGPT)19  Albumin, Serum 4.6  Alkaline Phosphatase, Serum 67  Anion Gap, Serum 11  Aspartate Aminotransferase (AST/SGOT)17  Bilirubin Total, Serum 0.5  Blood Urea Nitrogen, Serum 19  Calcium,Total Serum 9.8  Carbon Dioxide, Serum 26  Chloride, Serum 101  Creatinine, Serum 0.67  eGFR if  106  eGFR if Non African American 92  Glucose, Serum 165  Potassium, Serum 4.3  Protein Total, Serum 7.9  Sodium, Serum 138                          PT/INR  PT/INR  09-01-20 @ 15:33  INR 0.99  Prothrombin Time Comment --  Prothrobin Time, [id number]      Amylase/Lipase            RADIOLOGY & ADDITIONAL TESTS:    Imaging Personally Reviewed:  [ ] YES  [ ] NO

## 2020-09-02 NOTE — PROGRESS NOTE ADULT - PROBLEM SELECTOR PLAN 2
-see plan above Hemoglobin A1c 9.9  -Endocrine consulted, f/u recs  -Lantus 15U at night  -Lispro 5U before meals   -c/2 moderate dose sliding scale 4x daily w/ meals and @ bedtime  -schedule outpatient appointment [facility] ([phone])

## 2020-09-02 NOTE — PROGRESS NOTE ADULT - ASSESSMENT
66 yo Female, shelter resident w/ PMHx DMII, HLD, Asthma, MDDr, and severe ASA allergy admitted on 9/1/20 for chest pain that r/o ACS, now transferred to the [facility] for further evaluation of dysphagia/odynophagia.    who presented to the ED on 9/1/20 with reports of chest pain radiating to left jaw, neck, and arm, with associated SOB, dizziness, diaphoresis, nausea/vomiting independent of activity, worsening over the last few months. Trop neg x 3. EKG NSR 68bpm with no ischemic changes. In ED given Plavix 75mg x 1, Morphine 2mg IV x 1 and started on IV fluids NS 125cc/hr. Pt initially admitted to [facility] for further management of chest pain and to r/o ACS. Troponin negative x 3, CCTA 9/2/20 revealed non-obstructive coronary artery disease. TTE 9/2/20 revealed no cardiac pathology with an EF 55%. VSS, no events on telemetry. Endocrine consulted for recommendations on diabetes management. Pt with HgbA1c of 9.9. SW consulted to evaluate pt's home situation as she lives in a shelter and reported feeling unsafe. Pt stable from a cardiac standpoint and transferred to the [facility] for further evaluation of dysphagia/odynophagia. Of note, patient says she feels unsafe at her shelter due to COVID and really doesn't want to go back there. 66 yo Female, shelter resident w/ PMHx DMII, HLD, Asthma, MDDr, and severe ASA allergy admitted on 9/1/20 for chest pain that r/o ACS, now transferred to the [facility] for further evaluation of odynophagia.

## 2020-09-02 NOTE — PROGRESS NOTE ADULT - PROBLEM SELECTOR PLAN 1
- currently chest pain free, resting comfortably but tearful and worried about recurrent episodes coming  - Trop neg x 1, f/u 8pm, 12am, and AM trop  - NPO after midnight for CCTA in AM.  - EKG NSR 68bpm with no ischemic changes.  - Pt had a Pharm NST a few years ago and was referred for CCTA but had anxiety and could not undergo test. Dr Crowe will attempt to convince patient to undergo CCTA in AM.   - Echo ordered  - CCTA ordered  - f/u hemoglobin A1C, lipid profile, TSH - Pt reporting intermittent mid chest pain with radiation to the jaw.   - Trop neg x 3  - CCTA 9/2/20 revealing calcium score 0, Non-obstructive coronary artery disease.  - EKG NSR 68bpm with no ischemic changes.  - F/u ECHO results    #Dysphagia  Pt reports she has been having painful/ difficult swallowing for the past week which may or may not be associated with the chest pain  - Speech and swallow consulted, f/u recs - Pt reporting intermittent mid chest pain with radiation to the jaw.   - Trop neg x 3  - CCTA 9/2/20 revealing calcium score 0, Non-obstructive coronary artery disease.  - EKG NSR 68bpm with no ischemic changes.  - ECHO  9/2/20: Normal left and right ventricular size and systolic function, Normal atria, No significant valvular disease, No pericardial effusion, EF 55%    #Dysphagia  Pt reports she has been having painful/ difficult swallowing for the past week which may or may not be associated with the chest pain  - Speech and swallow consulted, f/u recs

## 2020-09-02 NOTE — PROGRESS NOTE ADULT - PROBLEM SELECTOR PLAN 3
On admission pt tearful and states she does not feel comfortable living in her shelter. Pt denies SI/HI  -SW consulted to evaluate pt's home situation as she lives in a shelter and reported feeling unsafe.  -consider starting SSRI and setting up with outpatient therapist

## 2020-09-02 NOTE — PROGRESS NOTE ADULT - ASSESSMENT
64 yo Female, shelter resident, SEVERE ASPIRIN ALLERGY, w/ PMHx TD2M, HLD,  Asthma (last hospitalization 1 year ago, never intubated), Major Depressive Disorder presenting with worsening chest pain, radiating to left jaw, neck, and arm, with associated SOB, dizziness, diaphoresis, nausea/vomiting independent of activity, worsening over the last few months. Pain is a/w N/V and SOB, intermittent waxing and waning intensity throughout day, no direct association with PO intake.  She also had 1 episode of dizziness and blurry vision during this time where she almost fell, no LoC or head trauma.  Denies h/o panic attacks, fevers, chills, URI sx, abd pain, changes in urine/stool frequency or color, hematuria/hematochezia/melena, focal neuromuscular deficits. Pt had her most severe episode on 9/1/2020 AM, prompting her to call EMS. EMS gave pt Nitro SL x 2 with relief of symptoms. In [facility] ED /86. HR 77bpm (NSR). RR 18. Temp 99F. O2 sat 98% room air. Trop T neg x 1. D dimer wnl. EKG NSR 68bpm with no ischemic changes. In ED given Plavix 75mg x 1, Morphine 2mg IV x 1, and started on IV fluids NS 125cc/hr. Patient lives in a shelter, follows up regularly with PMD with evaluations for similar episodes in past with normal EKGs.  Patient has severe allergic rxn to ASA in recent years requiring epinephrine.  She also endorses MDD treated with medication, states she did not feel safe in her shelter since "I live alone and no one would know if anything happened to me", but feels safe in ED and denies SI/HI, anxiety, depression sx at this time. Pt is being admitted to [facility] for further management of chest pain with plan for Echo and CCTA in AM. 64 yo Female, shelter resident, SEVERE ASPIRIN ALLERGY, w/ PMHx DM II, HLD,  Asthma (last hospitalization 1 year ago, never intubated), Major Depressive Disorder who presented to the ED 9/1/20 with reports of chest pain radiating to left jaw, neck and arm with associated SOB, dizziness, diaphoresis, N/V independent of activity, worsening over the last few months. Trop neg x 3. EKG NSR 68bpm with no ischemic changes. In ED given Plavix 75mg x 1, Morphine 2mg IV x 1 and started on IV fluids NS 125cc/hr. Pt is being admitted to [facility] for further management of chest pain with plan for ECHO and CCTA to r/o ACS.

## 2020-09-02 NOTE — CONSULT NOTE ADULT - SUBJECTIVE AND OBJECTIVE BOX
HPI: 65yFemale, shelter resident, SEVERE ASPIRIN ALLERGY, w/ PMHx TD2M, HLD, Asthma (last hospitalization 1 year ago, never intubated), Major Depressive Disorder presenting with chest pain. undergoing endocrine consult fro DM management.    Pt reports chest pain radiating to left jaw, neck, and arm, with associated SOB, dizziness, diaphoresis, nausea/vomiting worsening over the last few months.     In the ED /86. HR 77bpm (NSR). RR 18. Temp 99F. O2 sat 98% room air. Trop T neg x 1. D dimer wnl. EKG NSR 68bpm with no ischemic changes. In ED given Plavix 75mg x 1, Morphine 2mg IV x 1, and started on IV fluids NS 125cc/hr. Pt admitted for further management of chest pain with plan for Echo and cath.       Age at Dx:  How dx:  Hx and duration of insulin:  Current Therapy:  Hx of hypoglycemia  Hx of DKA/HHS?    Home FSG:  Fasting  Lunch  Dinner  Bed    Hx of other regimens  Complications:  Outpatient Endo:    PMH & Surgical Hx:CHEST PAIN; DM (DIABETESMELLITUS)  Handoff  MEWS Score  HTN (hypertension)  Depression  HLD (hyperlipidemia)  DM (diabetes mellitus)  Asthma  Chest pain  Depression  Asthma  DM (diabetes mellitus)  HLD (hyperlipidemia)  Chest pain  CHEST PAIN  HLD (hyperlipidemia)  DM (diabetes mellitus)  [email or web address]      FH:  DM:  Thyroid:  Autoimmune:  Other:    SH:  Smoking  Etoh:  Recreational Drugs:  Social Life:    Current Meds:  atorvastatin 40 milliGRAM(s) Oral at bedtime  budesonide 160 MICROgram(s)/formoterol 4.5 MICROgram(s) Inhaler 2 Puff(s) Inhalation two times a day  dextrose 40% Gel 15 Gram(s) Oral once PRN  dextrose 5%. 1000 milliLiter(s) IV Continuous <Continuous>  dextrose 50% Injectable 12.5 Gram(s) IV Push once  dextrose 50% Injectable 25 Gram(s) IV Push once  dextrose 50% Injectable 25 Gram(s) IV Push once  enoxaparin Injectable 40 milliGRAM(s) SubCutaneous every 24 hours  glucagon  Injectable 1 milliGRAM(s) IntraMuscular once PRN  influenza   Vaccine 0.5 milliLiter(s) IntraMuscular once  insulin lispro (HumaLOG) corrective regimen sliding scale   SubCutaneous Before meals and at bedtime  ketotifen 0.025% Ophthalmic Solution 1 Drop(s) Both EYES daily  polyethylene glycol 3350 17 Gram(s) Oral daily  sertraline 50 milliGRAM(s) Oral daily  sodium chloride 0.9%. 1000 milliLiter(s) IV Continuous <Continuous>      Allergies:  aspirin (Rash)  penicillin (Rash)      ROS:  Denies the following except as indicated.    General: weight loss/weight gain, decreased appetite, fatigue  Eyes: Blurry vision, double vision, visual changes  ENT: Throat pain, changes in voice,   CV: palpitations, SOB, CP, cough  GI: NVD, difficulty swallowing, abdominal pain  : polyuria, dysuria  Endo: abnormal menses, temperature intolerance, decreased libido  MSK: weakness, joint pain  Skin: rash, dryness, diaphoresis  Heme: Easy bruising,bleeding  Neuro: HA, dizziness, lightheadedness, numbness tingling  Psych: Anxiety, Depression    Vital Signs Last 24 Hrs  T(C): 36.6 (02 Sep 2020 10:14), Max: 37.2 (01 Sep 2020 15:03)  T(F): 97.9 (02 Sep 2020 10:14), Max: 99 (01 Sep 2020 15:03)  HR: 65 (02 Sep 2020 09:00) (60 - 77)  BP: 129/69 (02 Sep 2020 09:00) (103/54 - 141/75)  BP(mean): 103 (01 Sep 2020 18:54) (103 - 103)  RR: 18 (02 Sep 2020 09:00) (16 - 18)  SpO2: 98% (02 Sep 2020 09:00) (95% - 99%)  Height (cm): 165.1 (09-01 @ 18:54)  Weight (kg): 63.5 (09-01 @ 18:54)  BMI (kg/m2): 23.3 (09-01 @ 18:54)      Constitutional: wn/wd in NAD.   HEENT: NCAT, MMM, OP clear, EOMI, , no proptosis or lid retraction  Neck: no thyromegaly or palpable thyroid nodules   Respiratory: lungs CTAB.  Cardiovascular: regular rhythm, normal S1 and S2, no audible murmurs, no peripheral edema  GI: soft, NT/ND, no masses/HSM appreciated.  Neurology: no tremors, DTR 2+  Skin: no visible rashes/lesions  Psychiatric: AAO x 3, normal affect/mood.  Ext: radial pulses intact, DP pulses intact, extremities warm, no cyanosis, clubbing or edema.       LABS:                        10.1   5.88  )-----------( 157      ( 02 Sep 2020 06:07 )             32.0     09-02    139  |  105  |  17  ----------------------------<  262<H>  4.6   |  25  |  0.72    Ca    8.4      02 Sep 2020 06:07  Mg     1.6     09-02    TPro  7.9  /  Alb  4.6  /  TBili  0.5  /  DBili  x   /  AST  17  /  ALT  19  /  AlkPhos  67  09-01    PT/INR - ( 01 Sep 2020 15:33 )   PT: 11.9 sec;   INR: 0.99          PTT - ( 01 Sep 2020 15:33 )  PTT:28.2 sec      Thyroid Stimulating Hormone, Serum: 1.577 (09-02 @ 06:07)      RADIOLOGY & ADDITIONAL STUDIES:  CAPILLARY BLOOD GLUCOSE      POCT Blood Glucose.: 258 mg/dL (02 Sep 2020 06:50)  POCT Blood Glucose.: 269 mg/dL (01 Sep 2020 21:52)        A/P:65y Female    1.  DM  Please continue lantus       units at night / morning.  Please continue lispro      units before each meal.  Please continue lispro moderate / low dose sliding scale four times daily with meals and at bedtime    Pt's fingerstick glucose goal is     Will continue to monitor     For discharge, pt can continue    Pt can follow up at discharge with [facility] by calling  to make an appointment.   Will discuss case with     and update primary team HPI: 65yFemale, shelter resident, SEVERE ASPIRIN ALLERGY, w/ PMHx TD2M, HLD, Asthma (last hospitalization 1 year ago, never intubated), Major Depressive Disorder presenting with chest pain. undergoing endocrine consult fro DM management.  Pt reports chest pain radiating to left jaw, neck, and arm, with associated SOB, dizziness, diaphoresis, nausea/vomiting worsening over the last few months.In the ED /86. HR 77bpm (NSR). RR 18. Temp 99F. O2 sat 98% room air. Trop T neg x 1. D dimer wnl. EKG NSR 68bpm with no ischemic changes. In ED given Plavix 75mg x 1, Morphine 2mg IV x 1, and started on IV fluids NS 125cc/hr. Pt admitted for further management of chest pain with plan for Echo and cath. pt reports family hx of DM in mother and sister. pt reports for breakfast she fontenot sboiled eggs with slice of bread and coffee. she sometimes skips lunch or has hotdog. dinner she has vegetable and chicken.       Age at Dx: 3 months ago  How dx: regular blood work   Hx and duration of insulin: never on insulin   Current Therapy:on glipizide 1 mg daily   Hx of hypoglycemia: denies  Hx of DKA/HHS?denies    Home FSG: checks FS x1, fasting 200-240      Hx of other regimens  Complications: neuropathy LE  Outpatient Endo:follows with PCP    PMH & Surgical Hx:  HTN (hypertension)  Depression  Asthma  Chest pain  DM (diabetes mellitus)  HLD (hyperlipidemia)            Current Meds:  atorvastatin 40 milliGRAM(s) Oral at bedtime  budesonide 160 MICROgram(s)/formoterol 4.5 MICROgram(s) Inhaler 2 Puff(s) Inhalation two times a day  dextrose 40% Gel 15 Gram(s) Oral once PRN  dextrose 5%. 1000 milliLiter(s) IV Continuous <Continuous>  dextrose 50% Injectable 12.5 Gram(s) IV Push once  dextrose 50% Injectable 25 Gram(s) IV Push once  dextrose 50% Injectable 25 Gram(s) IV Push once  enoxaparin Injectable 40 milliGRAM(s) SubCutaneous every 24 hours  glucagon  Injectable 1 milliGRAM(s) IntraMuscular once PRN  influenza   Vaccine 0.5 milliLiter(s) IntraMuscular once  insulin lispro (HumaLOG) corrective regimen sliding scale   SubCutaneous Before meals and at bedtime  ketotifen 0.025% Ophthalmic Solution 1 Drop(s) Both EYES daily  polyethylene glycol 3350 17 Gram(s) Oral daily  sertraline 50 milliGRAM(s) Oral daily  sodium chloride 0.9%. 1000 milliLiter(s) IV Continuous <Continuous>      Allergies:  aspirin (Rash)  penicillin (Rash)      ROS:  Denies the following except as indicated.    General: decreased appetite, fatigue  Eyes: Blurry vision, double vision, visual changes  ENT: Throat pain, changes in voice,   CV: palpitations, SOB, CP, cough  GI: NVD, difficulty swallowing, abdominal pain  : polyuria, dysuria  Endo: abnormal menses, temperature intolerance, decreased libido  MSK: weakness, joint pain  Skin: rash, dryness, diaphoresis  Heme: Easy bruising,bleeding  Neuro: HA, dizziness, lightheadedness, numbness tingling  Psych: Anxiety, Depression    Vital Signs Last 24 Hrs  T(C): 36.6 (02 Sep 2020 10:14), Max: 37.2 (01 Sep 2020 15:03)  T(F): 97.9 (02 Sep 2020 10:14), Max: 99 (01 Sep 2020 15:03)  HR: 65 (02 Sep 2020 09:00) (60 - 77)  BP: 129/69 (02 Sep 2020 09:00) (103/54 - 141/75)  BP(mean): 103 (01 Sep 2020 18:54) (103 - 103)  RR: 18 (02 Sep 2020 09:00) (16 - 18)  SpO2: 98% (02 Sep 2020 09:00) (95% - 99%)  Height (cm): 165.1 (09-01 @ 18:54)  Weight (kg): 63.5 (09-01 @ 18:54)  BMI (kg/m2): 23.3 (09-01 @ 18:54)      Constitutional: wn/wd in NAD.   HEENT: NCAT, MMM, OP clear, EOMI, , no proptosis or lid retraction  Neck: no thyromegaly or palpable thyroid nodules   Respiratory: lungs CTAB.  Cardiovascular: regular rhythm, normal S1 and S2, no audible murmurs, no peripheral edema  GI: soft, NT/ND, no masses/HSM appreciated.  Neurology: no tremors, DTR 2+  Skin: no visible rashes/lesions  Psychiatric: AAO x 3, normal affect/mood.  Ext: radial pulses intact, DP pulses intact, extremities warm, no cyanosis, clubbing or edema.       LABS:                        10.1   5.88  )-----------( 157      ( 02 Sep 2020 06:07 )             32.0     09-02    139  |  105  |  17  ----------------------------<  262<H>  4.6   |  25  |  0.72    Ca    8.4      02 Sep 2020 06:07  Mg     1.6     09-02    TPro  7.9  /  Alb  4.6  /  TBili  0.5  /  DBili  x   /  AST  17  /  ALT  19  /  AlkPhos  67  09-01    PT/INR - ( 01 Sep 2020 15:33 )   PT: 11.9 sec;   INR: 0.99          PTT - ( 01 Sep 2020 15:33 )  PTT:28.2 sec      Thyroid Stimulating Hormone, Serum: 1.577 (09-02 @ 06:07)      RADIOLOGY & ADDITIONAL STUDIES:  CAPILLARY BLOOD GLUCOSE      POCT Blood Glucose.: 258 mg/dL (02 Sep 2020 06:50)  POCT Blood Glucose.: 269 mg/dL (01 Sep 2020 21:52)        A/P:65y Female, shelter resident, SEVERE ASPIRIN ALLERGY, w/ PMHx TD2M, HLD, Asthma (last hospitalization 1 year ago, never intubated), Major Depressive Disorder presenting with chest pain. admitted for echo and cath.  Undergoing endocrine consult fro DM management.      1.  DM  a1c: 9.9  cr: 0.7, GFR: 87  weight: 63.5,BMI:23.3  Please continue lantus       units at night / morning.  Please continue lispro      units before each meal.  Please continue lispro moderate / low dose sliding scale four times daily with meals and at bedtime    Pt's fingerstick glucose goal is     Will continue to monitor     For discharge, pt can continue    Pt can follow up at discharge with [facility] by calling  to make an appointment.   Will discuss case with     and update primary team HPI: 65yFemale, shelter resident, SEVERE ASPIRIN ALLERGY, w/ PMHx TD2M, HLD, Asthma (last hospitalization 1 year ago, never intubated), Major Depressive Disorder presenting with chest pain. undergoing endocrine consult fro DM management.  Pt reports chest pain radiating to left jaw, neck, and arm, with associated SOB, dizziness, diaphoresis, nausea/vomiting worsening over the last few months.In the ED /86. HR 77bpm (NSR). RR 18. Temp 99F. O2 sat 98% room air. Trop T neg x 1. D dimer wnl. EKG NSR 68bpm with no ischemic changes. In ED given Plavix 75mg x 1, Morphine 2mg IV x 1, and started on IV fluids NS 125cc/hr. Pt admitted for further management of chest pain with plan for Echo and cath. pt reports family hx of DM in mother and sister. pt reports for breakfast she fontenot sboiled eggs with slice of bread and coffee. she sometimes skips lunch or has hotdog. dinner she has vegetable and chicken.       Age at Dx: 3 months ago  How dx: regular blood work   Hx and duration of insulin: never on insulin   Current Therapy:on glimipride 1 mg daily   Hx of hypoglycemia: denies  Hx of DKA/HHS?denies    Home FSG: checks FS x1, fasting 200-240      Hx of other regimens  Complications: neuropathy LE  Outpatient Endo:follows with PCP    PMH & Surgical Hx:  HTN (hypertension)  Depression  Asthma  Chest pain  DM (diabetes mellitus)  HLD (hyperlipidemia)            Current Meds:  atorvastatin 40 milliGRAM(s) Oral at bedtime  budesonide 160 MICROgram(s)/formoterol 4.5 MICROgram(s) Inhaler 2 Puff(s) Inhalation two times a day  dextrose 40% Gel 15 Gram(s) Oral once PRN  dextrose 5%. 1000 milliLiter(s) IV Continuous <Continuous>  dextrose 50% Injectable 12.5 Gram(s) IV Push once  dextrose 50% Injectable 25 Gram(s) IV Push once  dextrose 50% Injectable 25 Gram(s) IV Push once  enoxaparin Injectable 40 milliGRAM(s) SubCutaneous every 24 hours  glucagon  Injectable 1 milliGRAM(s) IntraMuscular once PRN  influenza   Vaccine 0.5 milliLiter(s) IntraMuscular once  insulin lispro (HumaLOG) corrective regimen sliding scale   SubCutaneous Before meals and at bedtime  ketotifen 0.025% Ophthalmic Solution 1 Drop(s) Both EYES daily  polyethylene glycol 3350 17 Gram(s) Oral daily  sertraline 50 milliGRAM(s) Oral daily  sodium chloride 0.9%. 1000 milliLiter(s) IV Continuous <Continuous>      Allergies:  aspirin (Rash)  penicillin (Rash)      ROS:  Denies the following except as indicated.    General: decreased appetite, fatigue  Eyes: Blurry vision, double vision, visual changes  ENT: Throat pain, changes in voice,   GI: NVD, abdominal pain  : polyuria, dysuria  Skin: rash, dryness, diaphoresis  Heme: Easy bruising,bleeding  Neuro: HA, dizziness, lightheadedness    Vital Signs Last 24 Hrs  T(C): 36.6 (02 Sep 2020 10:14), Max: 37.2 (01 Sep 2020 15:03)  T(F): 97.9 (02 Sep 2020 10:14), Max: 99 (01 Sep 2020 15:03)  HR: 65 (02 Sep 2020 09:00) (60 - 77)  BP: 129/69 (02 Sep 2020 09:00) (103/54 - 141/75)  BP(mean): 103 (01 Sep 2020 18:54) (103 - 103)  RR: 18 (02 Sep 2020 09:00) (16 - 18)  SpO2: 98% (02 Sep 2020 09:00) (95% - 99%)  Height (cm): 165.1 (09-01 @ 18:54)  Weight (kg): 63.5 (09-01 @ 18:54)  BMI (kg/m2): 23.3 (09-01 @ 18:54)      Constitutional:  in NAD.   Neck: no thyromegaly or palpable thyroid nodules   Respiratory: lungs CTAB.  Cardiovascular: regular rhythm, normal S1 and S2  GI: soft, NT/ND, no masses/HSM appreciated.  Neurology: no tremors, DTR 2+  Skin: no visible rashes/lesions  Psychiatric: AAO x 3, normal affect/mood.  Ext: radial pulses intact, no edema.       LABS:                        10.1   5.88  )-----------( 157      ( 02 Sep 2020 06:07 )             32.0     09-02    139  |  105  |  17  ----------------------------<  262<H>  4.6   |  25  |  0.72    Ca    8.4      02 Sep 2020 06:07  Mg     1.6     09-02    TPro  7.9  /  Alb  4.6  /  TBili  0.5  /  DBili  x   /  AST  17  /  ALT  19  /  AlkPhos  67  09-01    PT/INR - ( 01 Sep 2020 15:33 )   PT: 11.9 sec;   INR: 0.99          PTT - ( 01 Sep 2020 15:33 )  PTT:28.2 sec      Thyroid Stimulating Hormone, Serum: 1.577 (09-02 @ 06:07)      RADIOLOGY & ADDITIONAL STUDIES:  CAPILLARY BLOOD GLUCOSE      POCT Blood Glucose.: 258 mg/dL (02 Sep 2020 06:50)  POCT Blood Glucose.: 269 mg/dL (01 Sep 2020 21:52)        A/P:65y Female, shelter resident, SEVERE ASPIRIN ALLERGY, w/ PMHx TD2M, HLD, Asthma (last hospitalization 1 year ago, never intubated), Major Depressive Disorder presenting with chest pain. admitted for echo and cath.  Undergoing endocrine consult fro DM management.      1.  DM  a1c: 9.9  cr: 0.7, GFR: 87  weight: 63.5,BMI:23.3  Please start lantus  15     units at night.  Please start lispro  5    units before each meal.  Please continue lispro moderate dose sliding scale four times daily with meals and at bedtime    Pt's fingerstick glucose goal is     Will continue to monitor     For discharge, pt can continue    Pt can follow up at discharge with [facility] by calling  to make an appointment.    case seen and discussed with Dr. Gay   and update primary team

## 2020-09-02 NOTE — CONSULT NOTE ADULT - ATTENDING COMMENTS
Pt seen on rounds this afternoon.  DM was diagnosed only 3 months ago, treated with glimepiride.  Glucoses still elevated to the 200 range at home, A1c 9.9% on admission.  Diet at home remains a distinct barrier to improved control, though she also needs additional medication.    To start on basal/bolus in the hospital (15 Lantus/5 units premeal lispro)  Will assess insulin requirements in the hospital and see if she looks like orals as outpatient will be sufficient.  Need to see results of echo in terms of LV function--management with oral agents would not likely be possible if she is unable to take metformin.
65 year old woman with severe aspirin allergy,  history of DM2, asthma, major depressive disorder,   Presented to the ED on 9/1/2020 with chest pain radiating to the left jaw. Admitted to cardiology telemetry for ACS workup. Trops negative x 3, EKG without dynamic changes. TTE (normal LV function) and Coronary CT Angiogram (Calcium scor of 0) both not consistent with ischemic heart disease.   Internal medicine consulted for possible transfer to [facility] Medical floor as patient has been judged to not require telemetry    # Evaluation for transfer to internal medicine service  Agree with cardiology team’s assessment that patient no longer needs to be on telemetry service   Suitable for transfer to internal medicine floor    # Odynophagia   Describes pain as severe, 9/10 in intensity, quality as: “something sticking” in her sternal area when she swallows. Sensation lasts for seconds at a time. Before resolving completely. Does not experience it every time she swallows food. Only ~once a day on average. Only with solids.   Has never experienced this before in her life.   Occasionally experiences same chest pain (sternal, seconds at a time) when she is lying down at night, unrelated to food intake.   Describes occasional sensation of liquid moving up her esophagus when she lies down to sleep at night. Does not take any antacids or PPIs at home.   Most severe episode happened yesterday (9/1/2020) and was associated with radiation to neck, which prompted her to call EMS  Volunteered information that: “pill under my tongue that I got yesterday” (presumably sublingual nitro) relieved her symptoms almost completely.     Suspect esophageal origin for her chest discomfort.   May have undiagnosed GERD triggering esophageal spasms/dysmotility  Unable to rule out mechanical obstruction  No peripheral eosinophilia, but this does not rule out eosinophilic esophagitis.   Fungal /Viral esophagitis possible but less likely given no evidence of immunocompromise  -	barium swallow   -	Check HIV   -	GI consult to see if patient is a candidate for inpatient endoscopy   -	Speech and swallow eval   If not candidate for inpatient EGD, might benefit from trial of acid suppression.     MISC  Social work consult re: shelter placement   Continue home medications.     To-dos for housestaff  [ ] Admission Med Rec with pharmacy  [ ] Inform PCP about patient's admission  [ ] Fax d/c summary to PCP's office (especially if [facility]).    Rest of history and plan per Dr. Salcedo's excellent note above.  I was physically present for the key portions of the evaluation and management (E/M) service provided. I agree with the above history, physical, and plan which I have reviewed and edited where appropriate.   Plan discussed with Dr. Salcedo.

## 2020-09-02 NOTE — PROGRESS NOTE ADULT - PROBLEM SELECTOR PLAN 5
Pulmonary exam CTA b/l. Last hospitalization 1 year ago, never intubated  - c/w albuterol nebs and Spiriva 160mg-4.5mg 2 puffs BID.

## 2020-09-02 NOTE — PROGRESS NOTE ADULT - PROBLEM SELECTOR PLAN 1
Pt reports pain and difficulty swallowing liquids and solids for the past week. Reports history of GERD but does not take medications for it.  -Consult GI for possible EGD  -NPO after midnight  -f/u Barium swallow in AM  -f/u Speech and swallow consult    - Speech and swallow consulted, f/u recs. 1 week history of odynophagia made worse with eating food. Symptoms not present every time pt eats food. Describes sensation of food "sticking" to chest. States history of possible GERD but isn't taking any meds for it. Reports pain is worse at night. Denies sour taste in mouth. Not taking meds known to cause esophagitis. DDx includes esophageal strictures 2/2 GERD vs esophagitis (CBC negative for eosinophilia) vs esophageal dysmotility vs esophageal spasms (reports pain improved with nitroglycerin despite negative ACS workup) vs malingering (Pt admits that she does not want to stay in her Shelter anymore because she is scared of getting COVID).   -Consult GI for possible EGD  -NPO after midnight  -f/u Barium swallow in AM  -f/u Speech and swallow consult  -f/u SW to explore alternative housing options such as [facility].

## 2020-09-02 NOTE — PROGRESS NOTE ADULT - SUBJECTIVE AND OBJECTIVE BOX
S: Pt seen and examined bedside.  Patient denies C/P, SOB, N/V, dizziness, palpitations, and diaphoresis.  Pt denies fever/chills, dysuria, abdominal pain, diarrhea, and cough  12 Point ROS otherwise negative except as per HPI/subjective.     O: Vital Signs Last 24 Hrs  T(C): 36.6 (02 Sep 2020 10:14), Max: 37.2 (01 Sep 2020 15:03)  T(F): 97.9 (02 Sep 2020 10:14), Max: 99 (01 Sep 2020 15:03)  HR: 65 (02 Sep 2020 09:00) (60 - 77)  BP: 129/69 (02 Sep 2020 09:00) (103/54 - 141/75)  BP(mean): 103 (01 Sep 2020 18:54) (103 - 103)  RR: 18 (02 Sep 2020 09:00) (16 - 18)  SpO2: 98% (02 Sep 2020 09:00) (95% - 99%)    PHYSICAL EXAM:  GEN: NAD  HEENT: No JVD  PULM:  CTA B/L  CARD:  RRR, S1 and S2   ABD: +BS, NT, soft/ND	  EXT: No Edema B/L LE  NEURO: A+Ox3, no focal deficit  PSYCH: Mood Appropriate    LABS:                        10.1   5.88  )-----------( 157      ( 02 Sep 2020 06:07 )             32.0     09-02    139  |  105  |  17  ----------------------------<  262<H>  4.6   |  25  |  0.72    Ca    8.4      02 Sep 2020 06:07  Mg     1.6     09-02    TPro  7.9  /  Alb  4.6  /  TBili  0.5  /  DBili  x   /  AST  17  /  ALT  19  /  AlkPhos  67  09-01    PT/INR - ( 01 Sep 2020 15:33 )   PT: 11.9 sec;   INR: 0.99          PTT - ( 01 Sep 2020 15:33 )  PTT:28.2 sec  Troponin T, Serum: <0.01 ng/mL (09-02-20 @ 06:07)  Troponin T, Serum: <0.01 ng/mL (09-02-20 @ 00:24)  Troponin T, Serum: <0.01 ng/mL (09-01-20 @ 20:11)  Troponin T, Serum: <0.01 ng/mL (09-01-20 @ 15:32)      09-02 @ 07:01  -  09-02 @ 12:20  --------------------------------------------------------  IN: 0 mL / OUT: 0 mL / NET: 0 mL      Daily Height in cm: 165.1 (01 Sep 2020 18:54)    Daily S: Pt seen and examined bedside. Pt reports she continues to have intermittent mid chest discomfort which radiates to the jaw. Pt also reports pain and difficulty swallowing for the past week.   ON: Pt had 2 episodes of CP overnight, was given nitropaste and tylenol with relief of symptoms.   Patient denies SOB, N/V, dizziness, palpitations, and diaphoresis.  Pt denies fever/chills, dysuria, abdominal pain, diarrhea, and cough  12 Point ROS otherwise negative except as per HPI/subjective.     O: Vital Signs Last 24 Hrs  T(C): 36.6 (02 Sep 2020 10:14), Max: 37.2 (01 Sep 2020 15:03)  T(F): 97.9 (02 Sep 2020 10:14), Max: 99 (01 Sep 2020 15:03)  HR: 65 (02 Sep 2020 09:00) (60 - 77)  BP: 129/69 (02 Sep 2020 09:00) (103/54 - 141/75)  BP(mean): 103 (01 Sep 2020 18:54) (103 - 103)  RR: 18 (02 Sep 2020 09:00) (16 - 18)  SpO2: 98% (02 Sep 2020 09:00) (95% - 99%)    PHYSICAL EXAM:  GEN: NAD  HEENT: No JVD  PULM:  CTA B/L, no wheezes/ crackles   CARD:  RRR, S1 and S2   ABD: +BS, NT, soft/ND	  EXT: No Edema B/L LE, B/L radial pulses 2+, PT pulses 2+ B/L   NEURO: A+Ox3, no focal deficit  PSYCH: Mood Appropriate    LABS:                        10.1   5.88  )-----------( 157      ( 02 Sep 2020 06:07 )             32.0     09-02    139  |  105  |  17  ----------------------------<  262<H>  4.6   |  25  |  0.72    Ca    8.4      02 Sep 2020 06:07  Mg     1.6     09-02    TPro  7.9  /  Alb  4.6  /  TBili  0.5  /  DBili  x   /  AST  17  /  ALT  19  /  AlkPhos  67  09-01    PT/INR - ( 01 Sep 2020 15:33 )   PT: 11.9 sec;   INR: 0.99          PTT - ( 01 Sep 2020 15:33 )  PTT:28.2 sec  Troponin T, Serum: <0.01 ng/mL (09-02-20 @ 06:07)  Troponin T, Serum: <0.01 ng/mL (09-02-20 @ 00:24)  Troponin T, Serum: <0.01 ng/mL (09-01-20 @ 20:11)  Troponin T, Serum: <0.01 ng/mL (09-01-20 @ 15:32)      09-02 @ 07:01  -  09-02 @ 12:20  --------------------------------------------------------  IN: 0 mL / OUT: 0 mL / NET: 0 mL      Daily Height in cm: 165.1 (01 Sep 2020 18:54) PA Transfer Note from [facility] telemetry to [facility]   64 yo Female, shelter resident w/ PMHx DMII HLD, Asthma (last hospitalization 1 year ago, never intubated), Major Depressive Disorder who presented to the ED on 9/1/20 with reports of worsening chest pain, radiating to left jaw, neck, and arm, with associated SOB, dizziness, diaphoresis, nausea/vomiting independent of activity, worsening over the last few months. Pt was admitted to [facility] for r/o ACS. Troponin negative x 3, CCTA 9/2/20 revealing calcium score 0, Non-obstructive coronary artery disease. ECHO 9/2/20 revealing normal left and right ventricular size and systolic function, Normal atria, No significant valvular disease, No pericardial effusion, EF 55%. VSS, no events on telemetry. Endocrine was consulted for recommendations regarding diabetes management, pt with HgbA1c of 9.9. SW consulted to evaluate pt's home situation as she lives in a shelter and reported feeling unsafe. Pt endorsed pain and difficulty swallowing liquids and solids which may or may not be associated with the chest discomfort. Pt is now stable from a cardiac standpoint and will be transferred to the [facility] for further evaluation of dysphagia.       S: Pt seen and examined bedside. Pt reports she continues to have intermittent mid chest discomfort which radiates to the jaw. Pt also reports pain and difficulty swallowing for the past week.   ON: Pt had 2 episodes of CP overnight, was given nitropaste and Tylenol with relief of symptoms.   Patient denies SOB, N/V, dizziness, palpitations, and diaphoresis.  Pt denies fever/chills, dysuria, abdominal pain, diarrhea, and cough  12 Point ROS otherwise negative except as per HPI/subjective.     O: Vital Signs Last 24 Hrs  T(C): 36.6 (02 Sep 2020 10:14), Max: 37.2 (01 Sep 2020 15:03)  T(F): 97.9 (02 Sep 2020 10:14), Max: 99 (01 Sep 2020 15:03)  HR: 65 (02 Sep 2020 09:00) (60 - 77)  BP: 129/69 (02 Sep 2020 09:00) (103/54 - 141/75)  BP(mean): 103 (01 Sep 2020 18:54) (103 - 103)  RR: 18 (02 Sep 2020 09:00) (16 - 18)  SpO2: 98% (02 Sep 2020 09:00) (95% - 99%)    PHYSICAL EXAM:  GEN: NAD  HEENT: No JVD  PULM:  CTA B/L, no wheezes/ crackles   CARD:  RRR, S1 and S2   ABD: +BS, NT, soft/ND	  EXT: No Edema B/L LE, B/L radial pulses 2+, PT pulses 2+ B/L   NEURO: A+Ox3, no focal deficit  PSYCH: Mood Appropriate    LABS:                        10.1   5.88  )-----------( 157      ( 02 Sep 2020 06:07 )             32.0     09-02    139  |  105  |  17  ----------------------------<  262<H>  4.6   |  25  |  0.72    Ca    8.4      02 Sep 2020 06:07  Mg     1.6     09-02    TPro  7.9  /  Alb  4.6  /  TBili  0.5  /  DBili  x   /  AST  17  /  ALT  19  /  AlkPhos  67  09-01    PT/INR - ( 01 Sep 2020 15:33 )   PT: 11.9 sec;   INR: 0.99          PTT - ( 01 Sep 2020 15:33 )  PTT:28.2 sec  Troponin T, Serum: <0.01 ng/mL (09-02-20 @ 06:07)  Troponin T, Serum: <0.01 ng/mL (09-02-20 @ 00:24)  Troponin T, Serum: <0.01 ng/mL (09-01-20 @ 20:11)  Troponin T, Serum: <0.01 ng/mL (09-01-20 @ 15:32)      09-02 @ 07:01  -  09-02 @ 12:20  --------------------------------------------------------  IN: 0 mL / OUT: 0 mL / NET: 0 mL      Daily Height in cm: 165.1 (01 Sep 2020 18:54)

## 2020-09-02 NOTE — PROGRESS NOTE ADULT - PROBLEM SELECTOR PLAN 7
Pt reporting intermittent mid chest pain with radiation to the jaw.   - Trop neg x 3  - CCTA 9/2/20 revealing calcium score 0, Non-obstructive coronary artery disease.  - EKG NSR 68bpm with no ischemic changes.  - ECHO  9/2/20: Normal left and right ventricular size and systolic function, Normal atria, No significant valvular disease, No pericardial effusion, EF 55% F: Not tolerating PO  E: replete K<4, Mg<2  N: NPO     VTE Prophylaxis: Lovenox 40 Q24H  GI: not needed  C: Full Code  D: Pending

## 2020-09-02 NOTE — PROGRESS NOTE ADULT - PROBLEM SELECTOR PLAN 3
- Holding home Glipizide 1mg daily. Start low dose ICS.  - f/u hemoglobin a1c - Holding home Glipizide 1mg daily. Continue with MISS.   - Hemoglobin A1c 9.9, Endocrine consulted, f/u recs. - Holding home Glipizide 1mg daily.   - Hemoglobin A1c 9.9, Endocrine consulted, recommend 50U Lantus at bedtime, 5 units Lispro TID with meals, continue MISS. - Holding home Glipizide 1mg daily.   - Hemoglobin A1c 9.9, Endocrine consulted, recommend 15U Lantus at bedtime, 5 units Lispro TID with meals, continue MISS.

## 2020-09-02 NOTE — CONSULT NOTE ADULT - ASSESSMENT
66 yo Female, shelter resident, w/ PMHx DM II, HLD, Asthma (last hospitalization 1 year ago, never intubated), Major Depressive Disorder presented with chest pain radiating to her jaw, initially admitted to [facility] for ACS rule out now complaining of odynophagia      NOTE INCOMPLETE    # Odynophagia  - admit to medicine  - check HIV  - barium swallow  - GI consult    # ACS  - ruled out, no further workup or treatment per cardiology    d/w attending 66 yo Female, shelter resident, w/ PMHx DM II, HLD, Asthma (last hospitalization 1 year ago, never intubated), Major Depressive Disorder presented with chest pain radiating to her jaw, initially admitted to [facility] for ACS rule out now complaining of odynophagia    # Odynophagia  1 week history of odynophagia made worse with eating food. Reports a history of possible heartburn but isn't taking any meds for it. Also reports her pain is worse at night, denies sour taste in mouth. Isn't taking any meds known to cause esophagitis. Pt admits that she does not want to stay in her Shelter anymore because she is scared of getting COVID. CBC negative for eosinophilia to suggest eosinophilic esophagitis.   - DDx: strictures 2/2 GERD vs esophagitis vs esophageal dysmotility vs malingering   PLAN  - check HIV  - Speech & swallow   - barium swallow  - GI consult  -  consult to explore other housing options such as [facility]  - transfer to medicine for further workup    # ACS  - ruled out, no further workup or treatment per cardiology    d/w attending 66 yo Female, shelter resident, w/ PMHx DM II, HLD, Asthma (last hospitalization 1 year ago, never intubated), Major Depressive Disorder presented with chest pain radiating to her jaw, initially admitted to [facility] for ACS rule out now complaining of odynophagia    # Odynophagia  1 week history of odynophagia made worse with eating food. Reports a history of possible heartburn but isn't taking any meds for it. Also reports her pain is worse at night, denies sour taste in mouth. Isn't taking any meds known to cause esophagitis. Pt admits that she does not want to stay in her Shelter anymore because she is scared of getting COVID. CBC negative for eosinophilia to suggest eosinophilic esophagitis.   - DDx: strictures 2/2 GERD vs esophagitis vs esophageal dysmotility vs esophageal spasm vs malingering   - interestingly, the patient reported her pain got better with nitroglycerin despite having negative ACS workup. Nitroglycerin would help if pt was having esophageal spasm. GI would need to guide the workup.   PLAN  - check HIV  - Speech & swallow   - barium swallow  - GI consult  -  consult to explore other housing options such as [facility]. Malingering is high on the ddx  - transfer to medicine for further workup    # ACS  - ruled out, no further workup or treatment per cardiology    d/w attending 64 yo Female, shelter resident, w/ PMHx DM II, HLD, Asthma (last hospitalization 1 year ago, never intubated), Major Depressive Disorder presented with chest pain radiating to her jaw, initially admitted to [facility] for ACS rule out now complaining of odynophagia    # Odynophagia  1 week history of odynophagia made worse with eating food. Reports a history of possible heartburn but isn't taking any meds for it. Also reports her pain is worse at night, denies sour taste in mouth. Isn't taking any meds known to cause esophagitis. Pt admits that she does not want to stay in her Shelter anymore because she is scared of getting COVID. CBC negative for eosinophilia to suggest eosinophilic esophagitis.   - DDx: strictures 2/2 GERD vs esophagitis vs esophageal dysmotility vs esophageal spasm vs malingering   - interestingly, the patient reported her pain got better with nitroglycerin despite having negative ACS workup. Nitroglycerin would help if pt was having esophageal spasm. GI would need to guide the workup.   PLAN  - check HIV  - Speech & swallow   - barium swallow  - GI consult  -  consult to explore other housing options such as [facility].   - transfer to medicine for further workup    # ACS  - ruled out, no further workup or treatment per cardiology    d/w attending

## 2020-09-02 NOTE — PROGRESS NOTE ADULT - PROBLEM SELECTOR PLAN 2
- Continue Simvastatin 10mg daily at bedtime.  - F/u lipid profile - Home Simvastatin 10mg changed to Atorvastatin 40mg PO QD, continue  - TChol 212, Trig 384, , HDL 30

## 2020-09-02 NOTE — PROGRESS NOTE ADULT - PROBLEM SELECTOR PLAN 5
- pt tearful and states she does not feel comfortable living in her shelter  - Pt denies SI/HI  - pt attempted to contact her  and is upset she did not call her back  - Consult SW in AM     VTE PPX: Lovenox  Dispo: pending clinical progression - On admission pt tearful and states she does not feel comfortable living in her shelter  - Pt denies SI/HI  - SW consulted     VTE PPX: Lovenox  Dispo: pending clinical progression

## 2020-09-02 NOTE — PROGRESS NOTE ADULT - PROBLEM SELECTOR PLAN 8
F: Not tolerating PO  E: replete K<4, Mg<2  N: NPO     VTE Prophylaxis: Lovenox 40 Q24H  GI: not needed  C: Full Code  D: Pending

## 2020-09-02 NOTE — PROGRESS NOTE ADULT - SUBJECTIVE AND OBJECTIVE BOX
****INCOMPLETE****    66 yo Female, shelter resident, SEVERE ASPIRIN ALLERGY, w/ PMHx DM II, HLD, Asthma (last hospitalization 1 year ago, never intubated), Major Depressive Disorder who presented to the ED 9/1/20 with reports of chest pain radiating to left jaw, neck and arm with associated SOB, dizziness, diaphoresis, N/V independent of activity, worsening over the last few months. Trop neg x 3. EKG NSR 68bpm with no ischemic changes. In ED given Plavix 75mg x 1, Morphine 2mg IV x 1 and started on IV fluids NS 125cc/hr. Pt initially admitted to [facility] for further management of chest pain with plan for ECHO and CCTA, both of which were negative. Patient now reporting dysphagia with associated chest pain of variable intensity x1 week, made worse with food, and is worse at night. Pt reports that she think she have a h/o GERD but doesn't take any meds for it. She denies experiencing a sour taste in her mouth. ROS otherwise negative for fever, chills, SOB, n/v/c/d, numbness, or tingling.    SUBJECTIVE / INTERVAL HPI: Patient seen and examined at bedside.     VITAL SIGNS:  Vital Signs Last 24 Hrs  T(C): 36.7 (02 Sep 2020 13:00), Max: 37.2 (01 Sep 2020 18:54)  T(F): 98 (02 Sep 2020 13:00), Max: 99 (01 Sep 2020 18:54)  HR: 65 (02 Sep 2020 09:00) (60 - 77)  BP: 129/69 (02 Sep 2020 09:00) (103/54 - 138/86)  BP(mean): 103 (01 Sep 2020 18:54) (103 - 103)  RR: 18 (02 Sep 2020 09:00) (16 - 18)  SpO2: 98% (02 Sep 2020 09:00) (95% - 99%)    PHYSICAL EXAM:    General: WDWN  HEENT: NCAT; PERRL, anicteric sclera; MMM  Neck: supple, trachea midline  Cardiovascular: S1, S2 normal; RRR, no M/G/R  Respiratory: CTABL; no W/R/R  Gastrointestinal: soft, nontender, nondistended. bowel sounds present.  Skin: no ulcerations or visible rashes appreciated  Extremities: WWP; no edema, clubbing or cyanosis  Vascular: 2+ radial, DP/PT pulses B/L  Neurological: AAOx3; CN II-XII grossly intact; no focal deficits    MEDICATIONS:  MEDICATIONS  (STANDING):  atorvastatin 40 milliGRAM(s) Oral at bedtime  budesonide 160 MICROgram(s)/formoterol 4.5 MICROgram(s) Inhaler 2 Puff(s) Inhalation two times a day  dextrose 5%. 1000 milliLiter(s) (50 mL/Hr) IV Continuous <Continuous>  dextrose 50% Injectable 12.5 Gram(s) IV Push once  dextrose 50% Injectable 25 Gram(s) IV Push once  dextrose 50% Injectable 25 Gram(s) IV Push once  enoxaparin Injectable 40 milliGRAM(s) SubCutaneous every 24 hours  influenza   Vaccine 0.5 milliLiter(s) IntraMuscular once  insulin glargine Injectable (LANTUS) 15 Unit(s) SubCutaneous at bedtime  insulin lispro (HumaLOG) corrective regimen sliding scale   SubCutaneous Before meals and at bedtime  insulin lispro Injectable (HumaLOG) 5 Unit(s) SubCutaneous three times a day before meals  ketotifen 0.025% Ophthalmic Solution 1 Drop(s) Both EYES daily  polyethylene glycol 3350 17 Gram(s) Oral daily  sertraline 50 milliGRAM(s) Oral daily    MEDICATIONS  (PRN):  dextrose 40% Gel 15 Gram(s) Oral once PRN Blood Glucose LESS THAN 70 milliGRAM(s)/deciliter  glucagon  Injectable 1 milliGRAM(s) IntraMuscular once PRN Glucose LESS THAN 70 milligrams/deciliter      ALLERGIES:  Allergies    aspirin (Rash)  penicillin (Rash)    Intolerances        LABS:                        10.1   5.88  )-----------( 157      ( 02 Sep 2020 06:07 )             32.0     09-02    139  |  105  |  17  ----------------------------<  262<H>  4.6   |  25  |  0.72    Ca    8.4      02 Sep 2020 06:07  Mg     1.6     09-02    TPro  7.9  /  Alb  4.6  /  TBili  0.5  /  DBili  x   /  AST  17  /  ALT  19  /  AlkPhos  67  09-01    PT/INR - ( 01 Sep 2020 15:33 )   PT: 11.9 sec;   INR: 0.99          PTT - ( 01 Sep 2020 15:33 )  PTT:28.2 sec    CAPILLARY BLOOD GLUCOSE      POCT Blood Glucose.: 143 mg/dL (02 Sep 2020 11:37)      RADIOLOGY & ADDITIONAL TESTS: Reviewed.

## 2020-09-03 LAB
ANION GAP SERPL CALC-SCNC: 9 MMOL/L — SIGNIFICANT CHANGE UP (ref 5–17)
BASOPHILS # BLD AUTO: 0.04 K/UL — SIGNIFICANT CHANGE UP (ref 0–0.2)
BASOPHILS NFR BLD AUTO: 0.8 % — SIGNIFICANT CHANGE UP (ref 0–2)
BUN SERPL-MCNC: 9 MG/DL — SIGNIFICANT CHANGE UP (ref 7–23)
CALCIUM SERPL-MCNC: 8.9 MG/DL — SIGNIFICANT CHANGE UP (ref 8.4–10.5)
CHLORIDE SERPL-SCNC: 107 MMOL/L — SIGNIFICANT CHANGE UP (ref 96–108)
CO2 SERPL-SCNC: 24 MMOL/L — SIGNIFICANT CHANGE UP (ref 22–31)
CREAT SERPL-MCNC: 0.6 MG/DL — SIGNIFICANT CHANGE UP (ref 0.5–1.3)
EOSINOPHIL # BLD AUTO: 0.24 K/UL — SIGNIFICANT CHANGE UP (ref 0–0.5)
EOSINOPHIL NFR BLD AUTO: 4.9 % — SIGNIFICANT CHANGE UP (ref 0–6)
GLUCOSE BLDC GLUCOMTR-MCNC: 117 MG/DL — HIGH (ref 70–99)
GLUCOSE BLDC GLUCOMTR-MCNC: 122 MG/DL — HIGH (ref 70–99)
GLUCOSE BLDC GLUCOMTR-MCNC: 165 MG/DL — HIGH (ref 70–99)
GLUCOSE BLDC GLUCOMTR-MCNC: 184 MG/DL — HIGH (ref 70–99)
GLUCOSE SERPL-MCNC: 141 MG/DL — HIGH (ref 70–99)
HCT VFR BLD CALC: 34.2 % — LOW (ref 34.5–45)
HGB BLD-MCNC: 11.1 G/DL — LOW (ref 11.5–15.5)
IMM GRANULOCYTES NFR BLD AUTO: 0.2 % — SIGNIFICANT CHANGE UP (ref 0–1.5)
LYMPHOCYTES # BLD AUTO: 1.78 K/UL — SIGNIFICANT CHANGE UP (ref 1–3.3)
LYMPHOCYTES # BLD AUTO: 36.4 % — SIGNIFICANT CHANGE UP (ref 13–44)
MAGNESIUM SERPL-MCNC: 2 MG/DL — SIGNIFICANT CHANGE UP (ref 1.6–2.6)
MCHC RBC-ENTMCNC: 28.2 PG — SIGNIFICANT CHANGE UP (ref 27–34)
MCHC RBC-ENTMCNC: 32.5 GM/DL — SIGNIFICANT CHANGE UP (ref 32–36)
MCV RBC AUTO: 86.8 FL — SIGNIFICANT CHANGE UP (ref 80–100)
MONOCYTES # BLD AUTO: 0.42 K/UL — SIGNIFICANT CHANGE UP (ref 0–0.9)
MONOCYTES NFR BLD AUTO: 8.6 % — SIGNIFICANT CHANGE UP (ref 2–14)
NEUTROPHILS # BLD AUTO: 2.4 K/UL — SIGNIFICANT CHANGE UP (ref 1.8–7.4)
NEUTROPHILS NFR BLD AUTO: 49.1 % — SIGNIFICANT CHANGE UP (ref 43–77)
NRBC # BLD: 0 /100 WBCS — SIGNIFICANT CHANGE UP (ref 0–0)
PHOSPHATE SERPL-MCNC: 3.4 MG/DL — SIGNIFICANT CHANGE UP (ref 2.5–4.5)
PLATELET # BLD AUTO: 152 K/UL — SIGNIFICANT CHANGE UP (ref 150–400)
POTASSIUM SERPL-MCNC: 3.8 MMOL/L — SIGNIFICANT CHANGE UP (ref 3.5–5.3)
POTASSIUM SERPL-SCNC: 3.8 MMOL/L — SIGNIFICANT CHANGE UP (ref 3.5–5.3)
RBC # BLD: 3.94 M/UL — SIGNIFICANT CHANGE UP (ref 3.8–5.2)
RBC # FLD: 12.2 % — SIGNIFICANT CHANGE UP (ref 10.3–14.5)
SODIUM SERPL-SCNC: 140 MMOL/L — SIGNIFICANT CHANGE UP (ref 135–145)
WBC # BLD: 4.89 K/UL — SIGNIFICANT CHANGE UP (ref 3.8–10.5)
WBC # FLD AUTO: 4.89 K/UL — SIGNIFICANT CHANGE UP (ref 3.8–10.5)

## 2020-09-03 PROCEDURE — 99233 SBSQ HOSP IP/OBS HIGH 50: CPT | Mod: GC

## 2020-09-03 PROCEDURE — 74220 X-RAY XM ESOPHAGUS 1CNTRST: CPT | Mod: 26

## 2020-09-03 PROCEDURE — 99231 SBSQ HOSP IP/OBS SF/LOW 25: CPT | Mod: GC

## 2020-09-03 RX ORDER — POTASSIUM CHLORIDE 20 MEQ
20 PACKET (EA) ORAL ONCE
Refills: 0 | Status: COMPLETED | OUTPATIENT
Start: 2020-09-03 | End: 2020-09-03

## 2020-09-03 RX ORDER — SODIUM CHLORIDE 9 MG/ML
1000 INJECTION INTRAMUSCULAR; INTRAVENOUS; SUBCUTANEOUS
Refills: 0 | Status: DISCONTINUED | OUTPATIENT
Start: 2020-09-03 | End: 2020-09-03

## 2020-09-03 RX ORDER — METFORMIN HYDROCHLORIDE 850 MG/1
500 TABLET ORAL
Refills: 0 | Status: DISCONTINUED | OUTPATIENT
Start: 2020-09-03 | End: 2020-09-03

## 2020-09-03 RX ORDER — ACETAMINOPHEN 500 MG
650 TABLET ORAL ONCE
Refills: 0 | Status: COMPLETED | OUTPATIENT
Start: 2020-09-03 | End: 2020-09-03

## 2020-09-03 RX ORDER — PANTOPRAZOLE SODIUM 20 MG/1
40 TABLET, DELAYED RELEASE ORAL
Refills: 0 | Status: DISCONTINUED | OUTPATIENT
Start: 2020-09-03 | End: 2020-09-04

## 2020-09-03 RX ORDER — INSULIN GLARGINE 100 [IU]/ML
18 INJECTION, SOLUTION SUBCUTANEOUS AT BEDTIME
Refills: 0 | Status: DISCONTINUED | OUTPATIENT
Start: 2020-09-03 | End: 2020-09-04

## 2020-09-03 RX ORDER — INSULIN LISPRO 100/ML
5 VIAL (ML) SUBCUTANEOUS
Refills: 0 | Status: DISCONTINUED | OUTPATIENT
Start: 2020-09-03 | End: 2020-09-04

## 2020-09-03 RX ADMIN — SODIUM CHLORIDE 40 MILLILITER(S): 9 INJECTION INTRAMUSCULAR; INTRAVENOUS; SUBCUTANEOUS at 08:36

## 2020-09-03 RX ADMIN — Medication 2: at 12:26

## 2020-09-03 RX ADMIN — Medication 50 MILLIEQUIVALENT(S): at 11:21

## 2020-09-03 RX ADMIN — Medication 5 UNIT(S): at 12:26

## 2020-09-03 RX ADMIN — SODIUM CHLORIDE 125 MILLILITER(S): 9 INJECTION INTRAMUSCULAR; INTRAVENOUS; SUBCUTANEOUS at 03:53

## 2020-09-03 RX ADMIN — INSULIN GLARGINE 18 UNIT(S): 100 INJECTION, SOLUTION SUBCUTANEOUS at 22:06

## 2020-09-03 RX ADMIN — ENOXAPARIN SODIUM 40 MILLIGRAM(S): 100 INJECTION SUBCUTANEOUS at 22:06

## 2020-09-03 RX ADMIN — BUDESONIDE AND FORMOTEROL FUMARATE DIHYDRATE 2 PUFF(S): 160; 4.5 AEROSOL RESPIRATORY (INHALATION) at 22:08

## 2020-09-03 RX ADMIN — Medication 650 MILLIGRAM(S): at 07:07

## 2020-09-03 RX ADMIN — Medication 650 MILLIGRAM(S): at 08:00

## 2020-09-03 RX ADMIN — Medication 2: at 08:28

## 2020-09-03 RX ADMIN — BUDESONIDE AND FORMOTEROL FUMARATE DIHYDRATE 2 PUFF(S): 160; 4.5 AEROSOL RESPIRATORY (INHALATION) at 08:31

## 2020-09-03 RX ADMIN — PANTOPRAZOLE SODIUM 40 MILLIGRAM(S): 20 TABLET, DELAYED RELEASE ORAL at 17:13

## 2020-09-03 RX ADMIN — SERTRALINE 50 MILLIGRAM(S): 25 TABLET, FILM COATED ORAL at 11:21

## 2020-09-03 RX ADMIN — POLYETHYLENE GLYCOL 3350 17 GRAM(S): 17 POWDER, FOR SOLUTION ORAL at 11:21

## 2020-09-03 RX ADMIN — KETOTIFEN FUMARATE 1 DROP(S): 0.34 SOLUTION OPHTHALMIC at 11:21

## 2020-09-03 RX ADMIN — ATORVASTATIN CALCIUM 40 MILLIGRAM(S): 80 TABLET, FILM COATED ORAL at 22:06

## 2020-09-03 RX ADMIN — Medication 5 UNIT(S): at 17:15

## 2020-09-03 NOTE — PROGRESS NOTE ADULT - PROBLEM SELECTOR PLAN 4
TChol 212, Trig 384, , HDL 30  -c/w atorvastatin 40mg PO qd TChol 212, Trig 384, , HDL 30  - c/w atorvastatin 40mg PO qd

## 2020-09-03 NOTE — PROGRESS NOTE ADULT - ASSESSMENT
64 yo Female, shelter resident w/ PMHx DMII, HLD, Asthma, MDDr, and severe ASA allergy admitted on 9/1/20 for chest pain that r/o ACS, now transferred to the [facility] for further evaluation of odynophagia.

## 2020-09-03 NOTE — PROGRESS NOTE ADULT - PROBLEM SELECTOR PLAN 6
Presented to the ED on 9/1/20 with reports of chest pain radiating to left jaw, neck, and arm, with associated SOB, dizziness, diaphoresis, nausea/vomiting independent of activity, worsening over the last few months. In ED given Plavix 75mg x 1, Morphine 2mg IV x 1 and started on IV fluids NS 125cc/hr. Pt initially admitted to [facility] for further management of chest pain and to r/o ACS. Trop neg x 3. EKG NSR 68bpm with no ischemic changes. CCTA 9/2/20 revealed non-obstructive coronary artery disease. TTE 9/2/20 revealed no cardiac pathology with an EF 55%. VSS, no events on telemetry.  - pt stable from cardiac standpoint and transferred to [facility]  - repeat EKG if pt c/o new onset chest pain
Presented to the ED on 9/1/20 with reports of chest pain radiating to left jaw, neck, and arm, with associated SOB, dizziness, diaphoresis, nausea/vomiting independent of activity, worsening over the last few months. In ED given Plavix 75mg x 1, Morphine 2mg IV x 1 and started on IV fluids NS 125cc/hr. Pt initially admitted to [facility] for further management of chest pain and to r/o ACS. Trop neg x 3. EKG NSR 68bpm with no ischemic changes. CCTA 9/2/20 revealed non-obstructive coronary artery disease. TTE 9/2/20 revealed no cardiac pathology with an EF 55%. VSS, no events on telemetry.  -pt stable from cardiac standpoint and transferred to [facility]  -repeat EKG if pt c/o new onset chest pain

## 2020-09-03 NOTE — CHART NOTE - NSCHARTNOTEFT_GEN_A_CORE
Admitting Diagnosis:   Patient is a 65y old  Female who presents with a chief complaint of chest pain (03 Sep 2020 14:12)      Consult: Yes [   ]  No [  X ]    Reason for Initial Nutrition Assessment:      PAST MEDICAL & SURGICAL HISTORY:  HTN (hypertension)  Depression  HLD (hyperlipidemia)  DM (diabetes mellitus)  Asthma      Current Nutrition Order: DASH/TLC    PO Intake: Excellent (%) [ X  ]  Good (50-75%) [   ]  Fair (25-50%) [   ]  Poor (<25%) [   ]    GI Issues: Pt denies n/v/c/d, last BM 9/2    Pain: pt denies pain/discomfort at this time to RD- pain 2/10 noted in flow sheets to unspecified area     Skin Integrity: no edema, no pressure injuries noted. Marcin score 20    Labs:   09-03    140  |  107  |  9   ----------------------------<  141<H>  3.8   |  24  |  0.60    Ca    8.9      03 Sep 2020 06:10  Phos  3.4     09-03  Mg     2.0     09-03      CAPILLARY BLOOD GLUCOSE      POCT Blood Glucose.: 184 mg/dL (03 Sep 2020 12:24)  POCT Blood Glucose.: 165 mg/dL (03 Sep 2020 08:21)  POCT Blood Glucose.: 190 mg/dL (02 Sep 2020 21:29)    Nutritionally Pertinent Lab Values: POCT (9/3): 165, (9/2): 143-258, (9/3): , (9/2): HbA1c 9.9%, Chol 212, , HDL 30,     Medications:  MEDICATIONS  (STANDING):  atorvastatin 40 milliGRAM(s) Oral at bedtime  budesonide 160 MICROgram(s)/formoterol 4.5 MICROgram(s) Inhaler 2 Puff(s) Inhalation two times a day  dextrose 5%. 1000 milliLiter(s) (50 mL/Hr) IV Continuous <Continuous>  dextrose 50% Injectable 12.5 Gram(s) IV Push once  dextrose 50% Injectable 25 Gram(s) IV Push once  dextrose 50% Injectable 25 Gram(s) IV Push once  enoxaparin Injectable 40 milliGRAM(s) SubCutaneous every 24 hours  influenza   Vaccine 0.5 milliLiter(s) IntraMuscular once  insulin glargine Injectable (LANTUS) 18 Unit(s) SubCutaneous at bedtime  insulin lispro (HumaLOG) corrective regimen sliding scale   SubCutaneous Before meals and at bedtime  insulin lispro Injectable (HumaLOG) 5 Unit(s) SubCutaneous three times a day with meals  ketotifen 0.025% Ophthalmic Solution 1 Drop(s) Both EYES daily  metFORMIN 500 milliGRAM(s) Oral two times a day  pantoprazole  Injectable 40 milliGRAM(s) IV Push two times a day  polyethylene glycol 3350 17 Gram(s) Oral daily  sertraline 50 milliGRAM(s) Oral daily  sodium chloride 0.9%. 1000 milliLiter(s) (40 mL/Hr) IV Continuous <Continuous>    MEDICATIONS  (PRN):  dextrose 40% Gel 15 Gram(s) Oral once PRN Blood Glucose LESS THAN 70 milliGRAM(s)/deciliter  glucagon  Injectable 1 milliGRAM(s) IntraMuscular once PRN Glucose LESS THAN 70 milligrams/deciliter      Admitted Anthropometrics:  Height: 65" Weight: 140lbs/63.6kg   IBW 125lbs+/-10%, %%, BMI 23.3kg/m2      Weight Change: States no noticeable changes in weight, UBW ~140 lbs, consistent with admission weight  Please obtain and trend weekly weights    Nutrition Focused Physical Exam: Completed [   ]  Unable to complete [   ]- Not pertinent       Estimated energy needs:   ABW (63.6kg) used for calculations as pt between % of IBW. Needs adjusted for age, weight maintenance in adults    Energy: 9931-9720 kcal/day (20-25 kcal/kg)   Protein: 64-76g pro/day (1.0-1.2g pro/day)  Fluids: 1810-1928 ml/day (30-35 ml/kg)    Subjective:   64 yo Female, shelter resident w/ PMHx DMII, HLD, Asthma, MDDr, and severe ASA allergy admitted on 9/1/20 for chest pain that r/o ACS, transferred to the [facility] for further evaluation of odynophagia. Pt s/p MBS 9/3- findings pending documentation.     Upon assessment, pt resting up in bed. Pt states PTA good appetite and PO intake, consuming meals that are delivered to her by a meal delivery service. Typically consuming oatmeals, eggs, farina, yogurt for breakfast, sometimes skips lunch based on timing of other meals but would eat turkey hotdog with sugar free applesauce. For dinner pt consumes brown rice, vegetables, chicken, fish or turkey. Pt states she avoids red meat and has been buying "sugar-free", "fat-free" and "cholesterol-free" products. Endorses NKFA, no chewing difficulties, pain with swallowing, s/p MBS with documented findings pending however was in room with hospitalist during RD visit who revealed MBS findings "were normal". Pt states she takes glimepiride x1 daily to manage her blood sugars, checks her finger sticks x1 daily in the AM (usual range 200-220), HbA1c (9/2) 9.9%- endocrine following. Pt states she consumed 100% of her tray this afternoon upon returning from procedure. States no noticeable changes in weight, UBW ~140 lbs, consistent with admission weight. Pt denies n/v/c/d, last BM 9/2. Provided in-depth DM diet and heart healthy diet education with handouts- emphasizing whole grains, lean sources of protein, balanced meals, healthy fats, identifying food sources of carbohydrates, portion sizes, consistent carbohydrate intake throughout the day, physical exercise. Pt appeared appreciative, interested and receptive- very likely to make lifestyle changes. Please see full nutrition recommendations below, RD to monitor and f/u per protocol.     Nutrition Diagnosis: Food and Nutrition Related Knowledge Deficit, RT lack of prior exposure to diet edu, AEB HbA1c 9.9%, elevated lipid panel     Goal: pt to teach back 2-3 education points, to make lifestyle adjustments as able    Recommendations:  1) Recommend DASH/TLC Consistent Carbohydrate Diet  2) RD to reinforce diet edu prn   3) Monitor weights, labs, skin integrity, GI distress, nutrient consumption  4) POCT q6hrs, maintain tight glycemic control  5) Pain and bowel regimen per team     Education: Provided in-depth DM diet and heart healthy diet education with handouts- emphasizing whole grains, lean sources of protein, balanced meals, healthy fats, identifying food sources of carbohydrates, portion sizes, consistent carbohydrate intake throughout the day, physical exercise    Risk Level: High [   ] Moderate [  X] Low [   ]    RD to follow up per protocol.  Briseida Delaney RDN

## 2020-09-03 NOTE — PROGRESS NOTE ADULT - PROBLEM SELECTOR PLAN 1
1 week history of odynophagia made worse with eating food. Symptoms not present every time pt eats food. Describes sensation of food "sticking" to chest. States history of possible GERD but isn't taking any meds for it. Reports pain is worse at night. Denies sour taste in mouth. Not taking meds known to cause esophagitis. DDx includes esophageal strictures 2/2 GERD vs esophagitis (CBC negative for eosinophilia) vs esophageal dysmotility vs esophageal spasms (reports pain improved with nitroglycerin despite negative ACS workup) vs malingering (Pt admits that she does not want to stay in her Shelter anymore because she is scared of getting COVID).   - f/u results of barium swallow   - Consult GI for possible EGD  - f/u Speech and swallow consult  - f/u SW to explore alternative housing options such as [facility].

## 2020-09-03 NOTE — PROGRESS NOTE ADULT - PROBLEM SELECTOR PLAN 7
F: Not tolerating PO  E: replete K<4, Mg<2  N: NPO     VTE Prophylaxis: Lovenox 40 Q24H  GI: not needed  C: Full Code  D: Pending F: Not tolerating PO  E: replete K<4, Mg<2  N: DASH/TLC    VTE Prophylaxis: Lovenox 40 Q24H  GI: not needed  C: Full Code  D: Pending

## 2020-09-03 NOTE — PROGRESS NOTE ADULT - PROBLEM SELECTOR PLAN 2
Hemoglobin A1c 9.9  - Endocrine consulted, f/u recs  - Lantus 15U at night  - Lispro 5U before meals   - c/w moderate dose sliding scale 4x daily w/ meals and @ bedtime  - schedule outpatient appointment [facility] ([phone])

## 2020-09-03 NOTE — PROGRESS NOTE ADULT - PROBLEM SELECTOR PLAN 3
On admission pt tearful and states she does not feel comfortable living in her shelter. Pt denies SI/HI  - SW consulted to evaluate pt's home situation as she lives in a shelter and reported feeling unsafe.  - consider starting SSRI and setting up with outpatient therapist On admission pt tearful and states she does not feel comfortable living in her shelter. Pt denies SI/HI  - SW consulted to evaluate pt's home situation as she lives in a shelter and reported feeling unsafe.  - started on zoloft  - consider setting up with outpatient therapist

## 2020-09-03 NOTE — PROGRESS NOTE ADULT - SUBJECTIVE AND OBJECTIVE BOX
OVERNIGHT EVENTS: Patient complained of a headache in the early AM, resolved with tylenol.    SUBJECTIVE: Patient seen and examined at bedside. States that she has epigastric pain and dysphagia unchanged from yesterday. Denies new onset chest pain, SOB, diaphoresis, change in bowel movements. Denies unintentional weight loss, subjective fevers, chills.     PHYSICAL EXAM:  Vital Signs Last 24 Hrs  T(C): 36.7 (02 Sep 2020 13:00), Max: 37.2 (01 Sep 2020 18:54)  T(F): 98 (02 Sep 2020 13:00), Max: 99 (01 Sep 2020 18:54)  HR: 67 (02 Sep 2020 15:00) (62 - 77)  BP: 130/64 (02 Sep 2020 15:00) (103/54 - 138/86)  BP(mean): 103 (01 Sep 2020 18:54) (103 - 103)  RR: 18 (02 Sep 2020 15:00) (18 - 18)  SpO2: 97% (02 Sep 2020 15:00) (95% - 99%)    General: NAD, laying in bed, well appearing, alert, interactive  HEENT: PERRLA, no scleral icterus  Cardio: +S1/S2, RRR, no murmurs  Resp: CTA b/l, no w/r/r  GI: +BSx4, soft, NT/ND  Extremities: no edema, clubbing, cyanosis  Vasc: 3+ peripheral pulses  Skin: No rashes  Neuro: AAOx3      MEDICATIONS  (STANDING):  atorvastatin 40 milliGRAM(s) Oral at bedtime  budesonide 160 MICROgram(s)/formoterol 4.5 MICROgram(s) Inhaler 2 Puff(s) Inhalation two times a day  dextrose 5%. 1000 milliLiter(s) (50 mL/Hr) IV Continuous <Continuous>  dextrose 50% Injectable 12.5 Gram(s) IV Push once  dextrose 50% Injectable 25 Gram(s) IV Push once  dextrose 50% Injectable 25 Gram(s) IV Push once  enoxaparin Injectable 40 milliGRAM(s) SubCutaneous every 24 hours  influenza   Vaccine 0.5 milliLiter(s) IntraMuscular once  insulin glargine Injectable (LANTUS) 15 Unit(s) SubCutaneous at bedtime  insulin lispro (HumaLOG) corrective regimen sliding scale   SubCutaneous Before meals and at bedtime  insulin lispro Injectable (HumaLOG) 5 Unit(s) SubCutaneous three times a day before meals  ketotifen 0.025% Ophthalmic Solution 1 Drop(s) Both EYES daily  polyethylene glycol 3350 17 Gram(s) Oral daily  sertraline 50 milliGRAM(s) Oral daily    MEDICATIONS  (PRN):  dextrose 40% Gel 15 Gram(s) Oral once PRN Blood Glucose LESS THAN 70 milliGRAM(s)/deciliter  glucagon  Injectable 1 milliGRAM(s) IntraMuscular once PRN Glucose LESS THAN 70 milligrams/deciliter        Allergies    aspirin (Rash)  penicillin (Rash)    Intolerances          LABS:                        10.1   5.88  )-----------( 157      ( 02 Sep 2020 06:07 )             32.0     09-02    139  |  105  |  17  ----------------------------<  262<H>  4.6   |  25  |  0.72    Ca    8.4      02 Sep 2020 06:07  Mg     1.6     09-02    TPro  7.9  /  Alb  4.6  /  TBili  0.5  /  DBili  x   /  AST  17  /  ALT  19  /  AlkPhos  67  09-01    PT/INR - ( 01 Sep 2020 15:33 )   PT: 11.9 sec;   INR: 0.99          PTT - ( 01 Sep 2020 15:33 )  PTT:28.2 sec  Fingerstick  glucose: POCT Blood Glucose.: 144 mg/dL (02 Sep 2020 16:55)        RADIOLOGY & ADDITIONAL TESTS: Reviewed.

## 2020-09-04 ENCOUNTER — TRANSCRIPTION ENCOUNTER (OUTPATIENT)
Age: 65
End: 2020-09-04

## 2020-09-04 VITALS
OXYGEN SATURATION: 99 % | SYSTOLIC BLOOD PRESSURE: 123 MMHG | DIASTOLIC BLOOD PRESSURE: 71 MMHG | RESPIRATION RATE: 17 BRPM | HEART RATE: 60 BPM | TEMPERATURE: 98 F

## 2020-09-04 LAB
ANION GAP SERPL CALC-SCNC: 11 MMOL/L — SIGNIFICANT CHANGE UP (ref 5–17)
BUN SERPL-MCNC: 10 MG/DL — SIGNIFICANT CHANGE UP (ref 7–23)
CALCIUM SERPL-MCNC: 9.7 MG/DL — SIGNIFICANT CHANGE UP (ref 8.4–10.5)
CHLORIDE SERPL-SCNC: 105 MMOL/L — SIGNIFICANT CHANGE UP (ref 96–108)
CO2 SERPL-SCNC: 25 MMOL/L — SIGNIFICANT CHANGE UP (ref 22–31)
CREAT SERPL-MCNC: 0.7 MG/DL — SIGNIFICANT CHANGE UP (ref 0.5–1.3)
GLUCOSE BLDC GLUCOMTR-MCNC: 151 MG/DL — HIGH (ref 70–99)
GLUCOSE BLDC GLUCOMTR-MCNC: 199 MG/DL — HIGH (ref 70–99)
GLUCOSE SERPL-MCNC: 162 MG/DL — HIGH (ref 70–99)
HCT VFR BLD CALC: 34.2 % — LOW (ref 34.5–45)
HGB BLD-MCNC: 11 G/DL — LOW (ref 11.5–15.5)
MCHC RBC-ENTMCNC: 27.7 PG — SIGNIFICANT CHANGE UP (ref 27–34)
MCHC RBC-ENTMCNC: 32.2 GM/DL — SIGNIFICANT CHANGE UP (ref 32–36)
MCV RBC AUTO: 86.1 FL — SIGNIFICANT CHANGE UP (ref 80–100)
NRBC # BLD: 0 /100 WBCS — SIGNIFICANT CHANGE UP (ref 0–0)
PLATELET # BLD AUTO: 171 K/UL — SIGNIFICANT CHANGE UP (ref 150–400)
POTASSIUM SERPL-MCNC: 4.2 MMOL/L — SIGNIFICANT CHANGE UP (ref 3.5–5.3)
POTASSIUM SERPL-SCNC: 4.2 MMOL/L — SIGNIFICANT CHANGE UP (ref 3.5–5.3)
RBC # BLD: 3.97 M/UL — SIGNIFICANT CHANGE UP (ref 3.8–5.2)
RBC # FLD: 12.4 % — SIGNIFICANT CHANGE UP (ref 10.3–14.5)
SODIUM SERPL-SCNC: 141 MMOL/L — SIGNIFICANT CHANGE UP (ref 135–145)
WBC # BLD: 5.37 K/UL — SIGNIFICANT CHANGE UP (ref 3.8–10.5)
WBC # FLD AUTO: 5.37 K/UL — SIGNIFICANT CHANGE UP (ref 3.8–10.5)

## 2020-09-04 PROCEDURE — 84484 ASSAY OF TROPONIN QUANT: CPT

## 2020-09-04 PROCEDURE — 86803 HEPATITIS C AB TEST: CPT

## 2020-09-04 PROCEDURE — 96374 THER/PROPH/DIAG INJ IV PUSH: CPT

## 2020-09-04 PROCEDURE — U0003: CPT

## 2020-09-04 PROCEDURE — 93306 TTE W/DOPPLER COMPLETE: CPT

## 2020-09-04 PROCEDURE — 99231 SBSQ HOSP IP/OBS SF/LOW 25: CPT | Mod: GC

## 2020-09-04 PROCEDURE — 85730 THROMBOPLASTIN TIME PARTIAL: CPT

## 2020-09-04 PROCEDURE — 82553 CREATINE MB FRACTION: CPT

## 2020-09-04 PROCEDURE — 99239 HOSP IP/OBS DSCHRG MGMT >30: CPT | Mod: GC

## 2020-09-04 PROCEDURE — 84443 ASSAY THYROID STIM HORMONE: CPT

## 2020-09-04 PROCEDURE — 80061 LIPID PANEL: CPT

## 2020-09-04 PROCEDURE — 99285 EMERGENCY DEPT VISIT HI MDM: CPT | Mod: 25

## 2020-09-04 PROCEDURE — 74220 X-RAY XM ESOPHAGUS 1CNTRST: CPT

## 2020-09-04 PROCEDURE — 80048 BASIC METABOLIC PNL TOTAL CA: CPT

## 2020-09-04 PROCEDURE — 82962 GLUCOSE BLOOD TEST: CPT

## 2020-09-04 PROCEDURE — 75574 CT ANGIO HRT W/3D IMAGE: CPT

## 2020-09-04 PROCEDURE — 71045 X-RAY EXAM CHEST 1 VIEW: CPT

## 2020-09-04 PROCEDURE — 85379 FIBRIN DEGRADATION QUANT: CPT

## 2020-09-04 PROCEDURE — 83735 ASSAY OF MAGNESIUM: CPT

## 2020-09-04 PROCEDURE — 83036 HEMOGLOBIN GLYCOSYLATED A1C: CPT

## 2020-09-04 PROCEDURE — 36415 COLL VENOUS BLD VENIPUNCTURE: CPT

## 2020-09-04 PROCEDURE — 84100 ASSAY OF PHOSPHORUS: CPT

## 2020-09-04 PROCEDURE — 87389 HIV-1 AG W/HIV-1&-2 AB AG IA: CPT

## 2020-09-04 PROCEDURE — 94640 AIRWAY INHALATION TREATMENT: CPT

## 2020-09-04 PROCEDURE — 82550 ASSAY OF CK (CPK): CPT

## 2020-09-04 PROCEDURE — 80053 COMPREHEN METABOLIC PANEL: CPT

## 2020-09-04 PROCEDURE — 85027 COMPLETE CBC AUTOMATED: CPT

## 2020-09-04 PROCEDURE — 93005 ELECTROCARDIOGRAM TRACING: CPT

## 2020-09-04 PROCEDURE — 85025 COMPLETE CBC W/AUTO DIFF WBC: CPT

## 2020-09-04 PROCEDURE — 85610 PROTHROMBIN TIME: CPT

## 2020-09-04 RX ORDER — PANTOPRAZOLE SODIUM 20 MG/1
1 TABLET, DELAYED RELEASE ORAL
Qty: 60 | Refills: 0
Start: 2020-09-04 | End: 2020-10-03

## 2020-09-04 RX ORDER — GLIMEPIRIDE 1 MG
1 TABLET ORAL
Qty: 30 | Refills: 0
Start: 2020-09-04 | End: 2020-10-03

## 2020-09-04 RX ORDER — PANTOPRAZOLE SODIUM 20 MG/1
40 TABLET, DELAYED RELEASE ORAL
Refills: 0 | Status: DISCONTINUED | OUTPATIENT
Start: 2020-09-04 | End: 2020-09-04

## 2020-09-04 RX ORDER — POLYETHYLENE GLYCOL 3350 17 G/17G
17 POWDER, FOR SOLUTION ORAL
Refills: 0 | Status: DISCONTINUED | OUTPATIENT
Start: 2020-09-04 | End: 2020-09-04

## 2020-09-04 RX ORDER — ACETAMINOPHEN 500 MG
650 TABLET ORAL ONCE
Refills: 0 | Status: COMPLETED | OUTPATIENT
Start: 2020-09-04 | End: 2020-09-04

## 2020-09-04 RX ADMIN — PANTOPRAZOLE SODIUM 40 MILLIGRAM(S): 20 TABLET, DELAYED RELEASE ORAL at 05:36

## 2020-09-04 RX ADMIN — Medication 5 UNIT(S): at 12:44

## 2020-09-04 RX ADMIN — BUDESONIDE AND FORMOTEROL FUMARATE DIHYDRATE 2 PUFF(S): 160; 4.5 AEROSOL RESPIRATORY (INHALATION) at 08:14

## 2020-09-04 RX ADMIN — Medication 5 UNIT(S): at 08:12

## 2020-09-04 RX ADMIN — SERTRALINE 50 MILLIGRAM(S): 25 TABLET, FILM COATED ORAL at 11:24

## 2020-09-04 RX ADMIN — Medication 2: at 08:12

## 2020-09-04 RX ADMIN — KETOTIFEN FUMARATE 1 DROP(S): 0.34 SOLUTION OPHTHALMIC at 11:22

## 2020-09-04 RX ADMIN — Medication 650 MILLIGRAM(S): at 05:36

## 2020-09-04 RX ADMIN — Medication 2: at 12:44

## 2020-09-04 RX ADMIN — Medication 650 MILLIGRAM(S): at 06:30

## 2020-09-04 NOTE — PROGRESS NOTE ADULT - SUBJECTIVE AND OBJECTIVE BOX
INTERVAL HPI/OVERNIGHT EVENTS:    Patient is a 65y old  Female who presents with a chief complaint of chest pain (04 Sep 2020 10:48)  pt was seen and examined at the bedside. pt had pancake and scrambled egg this morning.       FSG & Insulin received:    Yesterday:  pre-dinner fs  nutritional lispro  5 units  bedtime fs  lantus  18 units    Today:  pre-breakfast fs  nutritional lispro  5 units+  2 units lispro SS      Pt reports the following symptoms:    CONSTITUTIONAL:  Negative fever or chills, feels well, good appetite  CARDIOVASCULAR:  Negative for  palpitations  RESPIRATORY:  Negative for cough, wheezing, or SOB   GASTROINTESTINAL:  Negative for nausea, vomiting, diarrhea, constipation, or abdominal pain  GENITOURINARY:  Negative frequency, urgency or dysuria      Pt reports the following symptoms:    CONSTITUTIONAL:  Negative fever or chills, feels well, good appetite  EYES:  Negative  blurry vision or double vision  CARDIOVASCULAR:  Negative for chest pain or palpitations  RESPIRATORY:  Negative for cough, wheezing, or SOB   GASTROINTESTINAL:  Negative for nausea, vomiting, diarrhea, constipation, or abdominal pain  GENITOURINARY:  Negative frequency, urgency or dysuria  NEUROLOGIC:  No headache, confusion, dizziness, lightheadedness    MEDICATIONS  (STANDING):  atorvastatin 40 milliGRAM(s) Oral at bedtime  budesonide 160 MICROgram(s)/formoterol 4.5 MICROgram(s) Inhaler 2 Puff(s) Inhalation two times a day  dextrose 5%. 1000 milliLiter(s) (50 mL/Hr) IV Continuous <Continuous>  dextrose 50% Injectable 12.5 Gram(s) IV Push once  dextrose 50% Injectable 25 Gram(s) IV Push once  dextrose 50% Injectable 25 Gram(s) IV Push once  enoxaparin Injectable 40 milliGRAM(s) SubCutaneous every 24 hours  influenza   Vaccine 0.5 milliLiter(s) IntraMuscular once  insulin glargine Injectable (LANTUS) 18 Unit(s) SubCutaneous at bedtime  insulin lispro (HumaLOG) corrective regimen sliding scale   SubCutaneous Before meals and at bedtime  insulin lispro Injectable (HumaLOG) 5 Unit(s) SubCutaneous three times a day with meals  ketotifen 0.025% Ophthalmic Solution 1 Drop(s) Both EYES daily  pantoprazole    Tablet 40 milliGRAM(s) Oral two times a day  polyethylene glycol 3350 17 Gram(s) Oral two times a day  sertraline 50 milliGRAM(s) Oral daily    MEDICATIONS  (PRN):  dextrose 40% Gel 15 Gram(s) Oral once PRN Blood Glucose LESS THAN 70 milliGRAM(s)/deciliter  glucagon  Injectable 1 milliGRAM(s) IntraMuscular once PRN Glucose LESS THAN 70 milligrams/deciliter      Past medical history reviewed  Family history reviewed  Social history reviewed    PHYSICAL EXAM  Vital Signs Last 24 Hrs  T(C): 36.9 (04 Sep 2020 05:30), Max: 36.9 (04 Sep 2020 05:30)  T(F): 98.5 (04 Sep 2020 05:30), Max: 98.5 (04 Sep 2020 05:30)  HR: 60 (04 Sep 2020 05:30) (60 - 67)  BP: 123/71 (04 Sep 2020 05:30) (114/67 - 123/71)  BP(mean): --  RR: 17 (04 Sep 2020 05:30) (17 - 18)  SpO2: 99% (04 Sep 2020 05:30) (97% - 99%)    Constitutional:  in NAD.   Neck: no thyromegaly or palpable thyroid nodules   Respiratory: lungs CTAB.  Cardiovascular: regular rhythm, normal S1 and S2  GI: soft, NT/ND, no masses/HSM appreciated.  Neurology: no tremors, DTR 2+  Skin: no visible rashes/lesions  Psychiatric: AAO x 3, normal affect/mood.  Ext: radial pulses intact, no edema.     LABS:                        11.0   5.37  )-----------( 171      ( 04 Sep 2020 06:35 )             34.2     -04    141  |  105  |  10  ----------------------------<  162<H>  4.2   |  25  |  0.70    Ca    9.7      04 Sep 2020 06:35  Phos  3.4     -  Mg     2.0     -03          Thyroid Stimulating Hormone, Serum: 1.577 uIU/mL ( @ 06:07)      HbA1C: 9.9 % ( @ 06:07)  9.2 % ( @ 15:32)      CAPILLARY BLOOD GLUCOSE      POCT Blood Glucose.: 199 mg/dL (04 Sep 2020 11:53)  POCT Blood Glucose.: 151 mg/dL (04 Sep 2020 08:05)  POCT Blood Glucose.: 122 mg/dL (03 Sep 2020 21:55)  POCT Blood Glucose.: 117 mg/dL (03 Sep 2020 17:05)      Cholesterol, Serum: 212 mg/dL (20 @ 06:07)  HDL Cholesterol, Serum: 30 mg/dL (20 @ 06:07)  Triglycerides, Serum: 384 mg/dL (20 @ 06:07)  Direct LDL: 105 mg/dL (20 @ 06:07)      A/P:65y Female, shelter resident, [employer], w/ PMHx TD2M, HLD, Asthma (last hospitalization 1 year ago, never intubated), Major Depressive Disorder presenting with chest pain. admitted for echo and cath.  Undergoing endocrine consult fro DM management.      1.  DM  a1c: 9.9  cr: 0.7, GFR: 87  weight: 63.5,BMI:23.3  TTE: 55%  for discharge   lantus  15   units at night.    glimipride 1 mg daily   please provide insulin teaching by RN    Pt's fingerstick glucose goal is       Pt can follow up at discharge with [facility] by calling  to make an appointment.    case  discussed with Dr. Gay   and update primary team

## 2020-09-04 NOTE — DISCHARGE NOTE PROVIDER - NSDCCPCAREPLAN_GEN_ALL_CORE_FT
PRINCIPAL DISCHARGE DIAGNOSIS  Diagnosis: Dysphagia, unspecified type  Assessment and Plan of Treatment: You are complaining of dysphagia which is difficulty swallowing. There are a number of possible causes of this including obstruction in your esophagus caused by a narrowing, ulcers, inflammation, or a mass. You underwent a barium swallow which did not demonstrate a narrowing or mass. It did show some mild reflux and some contractions of your esophagus. You have been prescribed pantoprazole, which is a medication that decreases the amount of stomach acid and is currently helping your symptoms. Take pantoprazole twice a day, once in the morning and once in the evening. If you do not see any improvement within the next 2 weeks, follow up with a GI specialist. Some other things you can do to avoid your symptoms include avoiding smoking, avoiding laying flat right after eating food, avoiding foods that increase the production of acid like tomatoes, fried foods. Follow up with your outpatient GI specialist for further evaluation.      SECONDARY DISCHARGE DIAGNOSES  Diagnosis: Major depressive disorder  Assessment and Plan of Treatment: You have major depressive disorder    Diagnosis: Chest pain, unspecified  Assessment and Plan of Treatment: You came in with chest pain and underwent tests to evaluate the cause of chest pain. Your EKG, heart enzymes, ultrasound of the heart, and CT of your heart blood vessels were all normal. There was no evidence of abnormal heart rhythm or heart attacks. This chest pain seemed to improve upon treatment with pantoprazole and hence we think it is likely related to your reflux. Follow up with your PCP within 2 weeks of discharge.    Diagnosis: HLD (hyperlipidemia)  Assessment and Plan of Treatment: You have high blood cholesterol. This is a risk factor for heart disease and hence you were started on anti-cholesterol medicine called atorvastatin. Take this medication before you sleep. A side effect to watch out for on this medication is muscle pain. If you start having this problem, go see your doctor for evaluation.    Diagnosis: DM (diabetes mellitus)  Assessment and Plan of Treatment: You have diabetes, which causes you to have high blood sugar. You were taking glimeperide 1mg at home. An a1c is 9.9% (high) and is shows that your blood sugar levels are not well controlled on this medication. For this reason, another medication ___ PRINCIPAL DISCHARGE DIAGNOSIS  Diagnosis: Dysphagia, unspecified type  Assessment and Plan of Treatment: You are complaining of dysphagia which is difficulty swallowing. There are a number of possible causes of this including obstruction in your esophagus caused by a narrowing, ulcers, inflammation, or a mass. You underwent a barium swallow which did not demonstrate a narrowing or mass. It did show some mild reflux and some contractions of your esophagus. You have been prescribed pantoprazole, which is a medication that decreases the amount of stomach acid and is currently helping your symptoms. Take pantoprazole twice a day, once in the morning and once in the evening. If you do not see any improvement within the next 2 weeks, follow up with a GI specialist. Some other things you can do to avoid your symptoms include avoiding smoking, avoiding laying flat right after eating food, avoiding foods that increase the production of acid like tomatoes, fried foods. Follow up with your outpatient GI specialist for further evaluation. Follow up with your doctor, Dr. Johnson, on 9/19/20 at 3:30pm.      SECONDARY DISCHARGE DIAGNOSES  Diagnosis: Chest pain, unspecified  Assessment and Plan of Treatment: You came in with chest pain and underwent tests to evaluate the cause of chest pain. Your EKG, heart enzymes, ultrasound of the heart, and CT of your heart blood vessels were all normal. There was no evidence of abnormal heart rhythm or heart attacks. This chest pain seemed to improve upon treatment with pantoprazole and hence we think it is likely related to your reflux. Follow up with your doctor, Dr. Johnson, on 9/19/20 at 3:30pm.    Diagnosis: DM (diabetes mellitus)  Assessment and Plan of Treatment: You have diabetes, which causes you to have high blood sugar. You were taking glimeperide 1mg at home. An a1c is 9.9% (high) and is shows that your blood sugar levels are not well controlled on this medication. For this reason, the dose of glimeperide was increased to 2mg daily. Check your blood sugar levels at home and note it in a journal with the dates and times. Follow up with your doctor regularly for evaluation.      Diagnosis: HLD (hyperlipidemia)  Assessment and Plan of Treatment: You have high blood cholesterol. This is a risk factor for heart disease and hence you were started on anti-cholesterol medicine called atorvastatin. Take this medication before you sleep. A side effect to watch out for on this medication is muscle pain. If you start having this problem, go see your doctor for evaluation.    Diagnosis: Major depressive disorder  Assessment and Plan of Treatment: You have major depressive disorder. You are currently taking antidepressant Zoloft. Continue taking this medication and follow up with your outpatient physician regularly. PRINCIPAL DISCHARGE DIAGNOSIS  Diagnosis: Dysphagia, unspecified type  Assessment and Plan of Treatment: You are complaining of dysphagia which is difficulty swallowing. There are a number of possible causes of this including obstruction in your esophagus caused by a narrowing, ulcers, inflammation, or a mass. You underwent a barium swallow which did not demonstrate a narrowing or mass. It did show some mild reflux and some contractions of your esophagus. You have been prescribed pantoprazole, which is a medication that decreases the amount of stomach acid and is currently helping your symptoms. Take pantoprazole twice a day, once in the morning and once in the evening.  Please follow up with your GI specialist for further workup. Some other things you can do to avoid your symptoms include avoiding smoking, avoiding laying flat right after eating food, avoiding foods that increase the production of acid like tomatoes, fried foods. Follow up with your outpatient GI specialist for further evaluation. Follow up with your doctor, Dr. Johnson, on 9/19/20 at 3:30pm.      SECONDARY DISCHARGE DIAGNOSES  Diagnosis: Major depressive disorder  Assessment and Plan of Treatment: You have major depressive disorder. You are currently taking antidepressant Zoloft. Continue taking this medication and follow up with your outpatient physician regularly.    Diagnosis: Chest pain, unspecified  Assessment and Plan of Treatment: You came in with chest pain and underwent tests to evaluate the cause of chest pain. Your EKG, heart enzymes, ultrasound of the heart, and CT of your heart blood vessels were all normal. There was no evidence of abnormal heart rhythm or heart attacks. This chest pain seemed to improve upon treatment with pantoprazole and hence we think it is likely related to your reflux. Follow up with your doctor, Dr. Johnson, on 9/19/20 at 3:30pm.    Diagnosis: HLD (hyperlipidemia)  Assessment and Plan of Treatment: You have high blood cholesterol. This is a risk factor for heart disease and you should continue taking your cholesterol medication, simvastatin. Take this medication before you sleep. A side effect to watch out for on this medication is muscle pain. If you start having this problem, go see your doctor for evaluation.    Diagnosis: DM (diabetes mellitus)  Assessment and Plan of Treatment: You have diabetes, which causes you to have high blood sugar. You were taking glimeperide 1mg at home. An a1c is 9.9% (high) and is shows that your blood sugar levels are not well controlled on this medication. For this reason, the dose of glimeperide was increased to 2mg daily. Check your blood sugar levels at home and note it in a journal with the dates and times. Follow up with your doctor regularly for evaluation.

## 2020-09-04 NOTE — PROGRESS NOTE ADULT - SUBJECTIVE AND OBJECTIVE BOX
Patient seen and examined. Agree with resident's findings, assessment and plan. Agree with discharge diagnoses, as well as with plan of care and goals.  Time spent: 35 minutes for evaluation, plan of care, patient education, medication reconciliation, coordination of care, follow ups and transition to outpatient.    PE: Aox3, lungs clear, CV RRR, abd soft, nt, ext wwp no sig le edema    64 yo Female, shelter resident w/ PMHx DMII, HLD, Asthma, MDDr, and severe ASA allergy admitted on 9/1/20 for chest pain with n/v ruled out for ACS. cp resolved, residual dysphagia, neg barium swallow for obstruction/stricture but with GERD. Slowly improving on PPI. DDx includes untreated GERD vs esophagitis vs dysmotility (spasm). Plan for PPI trial and has close outpt GI followup for further workup, able to tolerate clears and soft foods and take pills  -Extensive neg CAD workup, CP now resolved, clean CTA no need for statin/asa  -Dsyphagia- PPI po bid, GI f/u  -DM- not wanting insulin (a1c 9's)- also states metformin allergy, will work with endo on po plan likely uptitration of current regimen and close outpt PCP f/u  -Shelter placement- dc to shelter today    Mitchell Cho MD [phone]

## 2020-09-04 NOTE — PROGRESS NOTE ADULT - ATTENDING COMMENTS
Glucoses remain under control, but initial plan to substitute metformin for the Lantus now has to be abandoned as the pt belatedly told us of adverse reaction to the drug in the past.  Will therefore continue the Lantus but in combination with glimepiride.
Pt seen on rounds this afternoon.  Barium swallow done earlier today, results pending.  Glucoses remain moderately elevated to the 160-190 range, though difficult to assess because she has been NPO.  Will increase the Lantus to 18 units but also start metformin.  Start 5 lispro premeal.  Would still plan to likely discharge on glimepiride and metformin
See PA note written above, for details. I reviewed the PA documentation.  I have personally seen and examined this patient 9/2/20. I reviewed vitals, labs, medications, cardiac studies and additional imaging.  I agree with the PA's findings and plans as written above with the following additions/amendments:  Patient presenting with atypical chest pain, prolonged, brought on with stress of living in shelter. TTE with no cardiac pathology, CCTA with non obstructive coronary artery disease.  Patient c/o dysphagia and pain when swallowing - event liquids  Non focal exam, euvolemic, RRR, no MGR, clear lungs, no edema  Plan for:  Atorvastatin 40 daily for HLD in DM patient  Endocrinology assessment for uncontrolled DM with hyperglycemia  Bedside swallow eval significant for pain with swallowing water - patient made NPO  Medicine consult for assessment of dysphagia/odynophagia, potential transfer for further GI work up  KATERIN Olmos.  Cardiology Attending
Patient was seen with med consult today for transfer eval- Please see med consult note for attending attestation

## 2020-09-04 NOTE — PROGRESS NOTE ADULT - PROVIDER SPECIALTY LIST ADULT
Endocrinology
Endocrinology
Hospitalist
Internal Medicine
Intervent Cardiology
Internal Medicine

## 2020-09-04 NOTE — DISCHARGE NOTE PROVIDER - NSDCMRMEDTOKEN_GEN_ALL_CORE_FT
albuterol 0.63 mg/3 mL (0.021%) inhalation solution: 3 milliliter(s) inhaled 3 times a day  glimepiride 1 mg oral tablet: 1 tab(s) orally once a day  MiraLax oral powder for reconstitution: 17 gram(s) orally once a day  olopatadine 0.2% ophthalmic solution: 1 drop(s) to each affected eye once a day  sertraline 50 mg oral tablet: 1 tab(s) orally once a day  simvastatin 10 mg oral tablet: 1 tab(s) orally once a day (at bedtime)  Symbicort 160 mcg-4.5 mcg/inh inhalation aerosol: 2 puff(s) inhaled 2 times a day albuterol 0.63 mg/3 mL (0.021%) inhalation solution: 3 milliliter(s) inhaled 3 times a day  glimepiride 2 mg oral tablet: 1 tab(s) orally once a day  MiraLax oral powder for reconstitution: 17 gram(s) orally once a day  olopatadine 0.2% ophthalmic solution: 1 drop(s) to each affected eye once a day  pantoprazole 40 mg oral delayed release tablet: 1 tab(s) orally 2 times a day  sertraline 50 mg oral tablet: 1 tab(s) orally once a day  simvastatin 10 mg oral tablet: 1 tab(s) orally once a day (at bedtime)  Symbicort 160 mcg-4.5 mcg/inh inhalation aerosol: 2 puff(s) inhaled 2 times a day

## 2020-09-04 NOTE — DISCHARGE NOTE PROVIDER - CARE PROVIDER_API CALL
[place] [address]  Phone: [phone]  Fax: [phone]  Established Patient  Scheduled Appointment: 09/18/2020 03:30 PM

## 2020-09-04 NOTE — DISCHARGE NOTE NURSING/CASE MANAGEMENT/SOCIAL WORK - PATIENT PORTAL LINK FT
You can access the FollowMyHealth Patient Portal offered by [facility] by registering at the following website: [email or web address]. By joining [employer]’s FollowMyHealth portal, you will also be able to view your health information using other applications (apps) compatible with our system.

## 2020-09-04 NOTE — PROGRESS NOTE ADULT - NSHPATTENDINGPLANDISCUSS_GEN_ALL_CORE
Dr. MIRELES and [address] [place], [place] [address]taff
Dr. MIRELES and [address] [place], [place] [address]taff
the patient and cardiac team
Dr. Jacob

## 2020-09-04 NOTE — DISCHARGE NOTE PROVIDER - PROVIDER TOKENS
PROVIDER:[TOKEN:[[id number]],SCHEDULEDAPPT:[09/18/2020],SCHEDULEDAPPTTIME:[03:30 PM],ESTABLISHEDPATIENT:[T]]

## 2020-09-04 NOTE — DISCHARGE NOTE PROVIDER - HOSPITAL COURSE
66 yo Female, shelter resident w/ PMHx DMII, HLD, Asthma, MDDr, and severe ASA allergy admitted on 9/1/20 for chest pain, underwent extensive cardiac evaluation which was unremarkable, admitted to cardiac telemetry without events. Transferred to medicine service for complaints of dysphagia, odynophagia x1 week which pt describes as worse on laying flat and after eating food. Denies hematemesis, hematochezia, weight loss, fevers, chills. Barium swallow 9/3 demonstrates mild GERD, symptoms improved on IV PPI. Patient will be discharged on PO PPI and to f/u outpatient with GI specialist for further evaluation. Pt also with HbA1c of 9.9, endocrinology consulted, recommend _____ in addition to home [id number].                  Problem/Plan - 1:    ·  Problem: Odynophagia.  Plan: 1 week history of odynophagia made worse with eating food. Symptoms not present every time pt eats food. Describes sensation of food "sticking" to chest. States history of possible GERD but isn't taking any meds for it. Reports pain is worse at night. Denies sour taste in mouth. Not taking meds known to cause esophagitis. DDx includes esophageal strictures 2/2 GERD vs esophagitis (CBC negative for eosinophilia) vs esophageal dysmotility vs esophageal spasms (reports pain improved with nitroglycerin despite negative ACS workup)     - barium swallow findings: No mass, stricture or other esophageal abnormality is seen. There is no hiatal hernia. Mild gastroesophageal reflux is observed during the examination, however water siphon test was negative. Moderate tertiary contractions.         Problem/Plan - 2:    ·  Problem: Type 2 diabetes mellitus.     - Hemoglobin A1c 9.9    - Endocrine consulted, recs_     - schedule outpatient appointment [facility] ([phone]).          Problem/Plan - 3:    ·  Problem: MDD (major depressive disorder).      On admission pt tearful and states she does not feel comfortable living in her shelter. Pt denies SI/HI    - SW consulted to evaluate pt's home situation as she lives in a shelter and reported feeling unsafe.    - started on zoloft         Problem/Plan - 4:    HLD (hyperlipidemia).      TChol 212, Trig 384, , HDL 30    - c/w atorvastatin 40mg PO qd.          Problem/Plan - 5:    Asthma.      Pulmonary exam CTA b/l. Last hospitalization 1 year ago, never intubated    - c/w albuterol nebs and Spiriva 160mg-4.5mg 2 puffs BID.          Problem/Plan - 6:    Chest pain     Presented to the ED on 9/1/20 with reports of chest pain radiating to left jaw, neck, and arm, with associated SOB, dizziness, diaphoresis, nausea/vomiting independent of activity, worsening over the last few months. In ED given Plavix 75mg x 1, Morphine 2mg IV x 1 and started on IV fluids NS 125cc/hr. Pt initially admitted to  Ur for further management of chest pain and to r/o ACS. Trop neg x 3. EKG NSR 68bpm with no ischemic changes. CCTA 9/2/20 revealed non-obstructive coronary artery disease. TTE 9/2/20 revealed no cardiac pathology with an EF 55%. VSS, no events on telemetry.    - pt stable from cardiac standpoint and transferred to [facility]    - likely          Problem/Plan - 7:    ·  Problem: Nutrition, metabolism, and development symptoms.  Plan: F: Not tolerating PO    E: replete K<4, Mg<2    N: DASH/TLC        VTE Prophylaxis: Lovenox 40 Q24H    GI: not needed    C: Full Code    D: Pending. 66 yo Female, shelter resident w/ PMHx DMII, HLD, Asthma, MDDr, and severe ASA allergy admitted on 9/1/20 for chest pain, underwent extensive cardiac evaluation which was unremarkable, admitted to cardiac telemetry without events. Transferred to medicine service for complaints of dysphagia, odynophagia x1 week which pt describes as worse on laying flat and after eating food. Denies hematemesis, hematochezia, weight loss, fevers, chills. Barium swallow 9/3 demonstrates mild GERD, symptoms improved on IV PPI. Patient will be discharged on PO PPI and to f/u outpatient with GI specialist for further evaluation. Pt also with HbA1c of 9.9, endocrinology consulted, recommend _____ in addition to home [id number].         #Dysphagia    1 week history of odynophagia made worse with eating food. Symptoms not present every time pt eats food. Describes sensation of food "sticking" to chest. States history of possible GERD but isn't taking any meds for it. Reports pain is worse at night. Denies sour taste in mouth. Not taking meds known to cause esophagitis. DDx includes esophageal strictures 2/2 GERD vs esophagitis (CBC negative for eosinophilia) vs esophageal dysmotility vs esophageal spasms (reports pain improved with nitroglycerin despite negative ACS workup)     - 9/3 barium swallow findings: No mass, stricture or other esophageal abnormality is seen. There is no hiatal hernia. Mild gastroesophageal reflux is observed during the examination, however water siphon test was negative. Moderate tertiary contractions.    - Pantoprazole 40mg BID        #Type 2 diabetes mellitus    - Hemoglobin A1c 9.9    - Endocrine consulted, recs_     - schedule outpatient appointment [facility] ([phone]).             #MDD (major depressive disorder).      On admission pt tearful and states she does not feel comfortable living in her shelter. Pt denies SI/HI    - SW consulted to evaluate pt's home situation as she lives in a shelter and reported feeling unsafe.    - started on zoloft    - f/u outpatient psychiatrist for full evaluation            #HLD (hyperlipidemia).      TChol 212, Trig 384, , HDL 30    - c/w atorvastatin 40mg PO qd.             #Asthma.      Pulmonary exam CTA b/l. Last hospitalization 1 year ago, never intubated    - c/w albuterol nebs and Spiriva 160mg-4.5mg 2 puffs BID.             #Chest pain     Presented to the ED on 9/1/20 with reports of chest pain radiating to left jaw, neck, and arm, with associated SOB, dizziness, diaphoresis, nausea/vomiting independent of activity, worsening over the last few months. In ED given Plavix 75mg x 1, Morphine 2mg IV x 1 and started on IV fluids NS 125cc/hr. Pt initially admitted to [facility] for further management of chest pain and to r/o ACS. Trop neg x 3. EKG NSR 68bpm with no ischemic changes. CCTA 9/2/20 revealed non-obstructive coronary artery disease. TTE 9/2/20 revealed no cardiac pathology with an EF 55%. VSS, no events on telemetry. Patient determined stable from cardiac standpoint. 64 yo Female, shelter resident w/ PMHx DMII, HLD, Asthma, MDDr, and severe ASA allergy admitted on 9/1/20 for chest pain, underwent extensive cardiac evaluation which was unremarkable, admitted to cardiac telemetry without events. Transferred to medicine service for complaints of dysphagia, odynophagia x1 week which pt describes as worse on laying flat and after eating food. Denies hematemesis, hematochezia, weight loss, fevers, chills. Barium swallow 9/3 demonstrates mild GERD, symptoms improved on IV PPI. Patient will be discharged on PO PPI and to f/u outpatient with GI specialist for further evaluation and management. Pt also with HbA1c of 9.9, increased glimepiride dose to 2mg QD.        #Dysphagia    1 week history of odynophagia made worse with eating food. Symptoms not present every time pt eats food. Describes sensation of food "sticking" to chest. States history of possible GERD but isn't taking any meds for it. Reports pain is worse at night. Denies sour taste in mouth. Not taking meds known to cause esophagitis. DDx includes esophageal strictures 2/2 GERD vs esophagitis (CBC negative for eosinophilia) vs esophageal dysmotility vs esophageal spasms (reports pain improved with nitroglycerin despite negative ACS workup)     - 9/3 barium swallow findings: No mass, stricture or other esophageal abnormality is seen. There is no hiatal hernia. Mild gastroesophageal reflux is observed during the examination, however water siphon test was negative. Moderate tertiary contractions.    - Pantoprazole 40mg BID        #Type 2 diabetes mellitus    - Hemoglobin A1c 9.9    - Increased dose of glimepiride to 2mg QD.    - schedule outpatient appointment [facility] ([phone]).             #MDD (major depressive disorder).      On admission pt tearful and states she does not feel comfortable living in her shelter. Pt denies SI/HI    - SW consulted to evaluate pt's home situation as she lives in a shelter and reported feeling unsafe.    - started on zoloft    - f/u outpatient psychiatrist for full evaluation            #HLD (hyperlipidemia).      TChol 212, Trig 384, , HDL 30    - c/w atorvastatin 40mg PO qd.             #Asthma.      Pulmonary exam CTA b/l. Last hospitalization 1 year ago, never intubated    - c/w albuterol nebs and Spiriva 160mg-4.5mg 2 puffs BID.             #Chest pain     Presented to the ED on 9/1/20 with reports of chest pain radiating to left jaw, neck, and arm, with associated SOB, dizziness, diaphoresis, nausea/vomiting independent of activity, worsening over the last few months. In ED given Plavix 75mg x 1, Morphine 2mg IV x 1 and started on IV fluids NS 125cc/hr. Pt initially admitted to [facility] for further management of chest pain and to r/o ACS. Trop neg x 3. EKG NSR 68bpm with no ischemic changes. CCTA 9/2/20 revealed non-obstructive coronary artery disease. TTE 9/2/20 revealed no cardiac pathology with an EF 55%. VSS, no events on telemetry. Patient determined stable from cardiac standpoint.            New medications: Protonix 40mg BID, glimepiride 2mg QD 66 yo Female, shelter resident w/ PMHx DMII, HLD, Asthma, MDDr, and severe ASA allergy admitted on 9/1/20 for chest pain, underwent extensive cardiac evaluation which was unremarkable, admitted to cardiac telemetry without events. Transferred to medicine service for complaints of dysphagia, odynophagia x1 week which pt describes as worse on laying flat and after eating food. Denies hematemesis, hematochezia, weight loss, fevers, chills. Barium swallow 9/3 demonstrates mild GERD, symptoms improved on IV PPI. Patient will be discharged on PO PPI and to f/u outpatient with GI specialist for further evaluation and management. Pt also with HbA1c of 9.9, increased glimepiride dose to 2mg QD.        #Dysphagia    1 week history of odynophagia made worse with eating food. Symptoms not present every time pt eats food. Describes sensation of food "sticking" to chest. States history of possible GERD but isn't taking any meds for it. Reports pain is worse at night. Denies sour taste in mouth. Not taking meds known to cause esophagitis. DDx includes esophageal strictures 2/2 GERD vs esophagitis (CBC negative for eosinophilia) vs esophageal dysmotility vs esophageal spasms (reports pain improved with nitroglycerin despite negative ACS workup)     - 9/3 barium swallow findings: No mass, stricture or other esophageal abnormality is seen. There is no hiatal hernia. Mild gastroesophageal reflux is observed during the examination, however water siphon test was negative. Moderate tertiary contractions.    - Pantoprazole 40mg BID    - to follow up with outpatient GI, consider EGD and manometry        #Type 2 diabetes mellitus    - Hemoglobin A1c 9.9    - Increased dose of glimepiride to 2mg QD.    - schedule outpatient appointment [facility] ([phone]).             #MDD (major depressive disorder).      On admission pt tearful and states she does not feel comfortable living in her shelter. Pt denies SI/HI    - SW consulted to evaluate pt's home situation as she lives in a shelter and reported feeling unsafe.    - started on zoloft    - f/u outpatient psychiatrist for full evaluation            #HLD (hyperlipidemia).      TChol 212, Trig 384, , HDL 30    - c/w simvastatin 20mg PO qd.             #Asthma.      Pulmonary exam CTA b/l. Last hospitalization 1 year ago, never intubated    - c/w albuterol nebs and Spiriva 160mg-4.5mg 2 puffs BID.             #Chest pain     Presented to the ED on 9/1/20 with reports of chest pain radiating to left jaw, neck, and arm, with associated SOB, dizziness, diaphoresis, nausea/vomiting independent of activity, worsening over the last few months. In ED given Plavix 75mg x 1, Morphine 2mg IV x 1 and started on IV fluids NS 125cc/hr. Pt initially admitted to [facility] for further management of chest pain and to r/o ACS. Trop neg x 3. EKG NSR 68bpm with no ischemic changes. CCTA 9/2/20 revealed non-obstructive coronary artery disease. TTE 9/2/20 revealed no cardiac pathology with an EF 55%. VSS, no events on telemetry. Patient determined stable from cardiac standpoint.            New medications: Protonix 40mg BID, glimepiride 2mg QD

## 2020-09-09 DIAGNOSIS — Z79.51 LONG TERM (CURRENT) USE OF INHALED STEROIDS: ICD-10-CM

## 2020-09-09 DIAGNOSIS — Z83.3 FAMILY HISTORY OF DIABETES MELLITUS: ICD-10-CM

## 2020-09-09 DIAGNOSIS — Z88.8 ALLERGY STATUS TO OTHER DRUGS, MEDICAMENTS AND BIOLOGICAL SUBSTANCES STATUS: ICD-10-CM

## 2020-09-09 DIAGNOSIS — R13.12 DYSPHAGIA, OROPHARYNGEAL PHASE: ICD-10-CM

## 2020-09-09 DIAGNOSIS — Z59.0 HOMELESSNESS: ICD-10-CM

## 2020-09-09 DIAGNOSIS — Z91.018 ALLERGY TO OTHER FOODS: ICD-10-CM

## 2020-09-09 DIAGNOSIS — J45.909 UNSPECIFIED ASTHMA, UNCOMPLICATED: ICD-10-CM

## 2020-09-09 DIAGNOSIS — R07.9 CHEST PAIN, UNSPECIFIED: ICD-10-CM

## 2020-09-09 DIAGNOSIS — E78.5 HYPERLIPIDEMIA, UNSPECIFIED: ICD-10-CM

## 2020-09-09 DIAGNOSIS — K21.9 GASTRO-ESOPHAGEAL REFLUX DISEASE WITHOUT ESOPHAGITIS: ICD-10-CM

## 2020-09-09 DIAGNOSIS — Z88.0 ALLERGY STATUS TO PENICILLIN: ICD-10-CM

## 2020-09-09 DIAGNOSIS — Z88.6 ALLERGY STATUS TO ANALGESIC AGENT: ICD-10-CM

## 2020-09-09 DIAGNOSIS — E11.65 TYPE 2 DIABETES MELLITUS WITH HYPERGLYCEMIA: ICD-10-CM

## 2020-09-09 DIAGNOSIS — F32.9 MAJOR DEPRESSIVE DISORDER, SINGLE EPISODE, UNSPECIFIED: ICD-10-CM

## 2020-09-09 DIAGNOSIS — Z79.84 LONG TERM (CURRENT) USE OF ORAL HYPOGLYCEMIC DRUGS: ICD-10-CM

## 2020-09-09 SDOH — ECONOMIC STABILITY - HOUSING INSECURITY: HOMELESSNESS: Z59.0

## 2020-10-21 ENCOUNTER — TRANSCRIPTION ENCOUNTER (OUTPATIENT)
Age: 65
End: 2020-10-21

## 2020-12-08 NOTE — ED ADULT NURSE NOTE - SUICIDE SCREENING QUESTION 3
pt c/o abdominal pain few hrs. Pt was seen in ED on 11/30 for same complaint was disgnosed with diverticulits but signed out AMA because staff would not give her food. Pt also c/o SOB, weakness, n/v. Denies chest pain. No

## 2021-02-11 NOTE — ED PROVIDER NOTE - INPATIENT RESIDENT/ACP NOTIFIED DATE
01-Sep-2020 16:56 Topical Clindamycin Counseling: Patient counseled that this medication may cause skin irritation or allergic reactions.  In the event of skin irritation, the patient was advised to reduce the amount of the drug applied or use it less frequently.   The patient verbalized understanding of the proper use and possible adverse effects of clindamycin.  All of the patient's questions and concerns were addressed.

## 2021-04-21 NOTE — ED PROVIDER NOTE - HEME LYMPH, MLM
normal appearance, trachea midline, no deformities No adenopathy or splenomegaly. No cervical or inguinal lymphadenopathy.

## 2021-05-01 PROCEDURE — G9005: CPT

## 2022-03-03 NOTE — PROGRESS NOTE ADULT - SUBJECTIVE AND OBJECTIVE BOX
Patient was seen and examined by me at bedside. I agree with resident's note, subjective, objective physical exam, assessment and plan with following modifications/additions.    Greater than 35 minutes spent on total encounter; more than 50% of the visit was spent counseling and/or coordinating care by the attending physician.    64 yo Female, shelter resident w/ PMHx DMII, HLD, Asthma, MDDr, and severe ASA allergy admitted on 9/1/20 for chest pain that r/o ACS, now transferred to the [facility] for further evaluation of dsyphagia  -Extensive neg CAD workup, CP now resolved  -Dsyphagia- GERD symptoms begin PPI, barium swallow to r/o sig obstruction, if neg likely dc on ppi. If new findings, GI consult re EGD  -Shelter placement- packet in order, likely dc to shelter tomorrow     Mitchell Cho MD [phone] No

## 2022-07-04 ENCOUNTER — EMERGENCY (EMERGENCY)
Facility: HOSPITAL | Age: 67
LOS: 0 days | Discharge: HOME | End: 2022-07-04
Attending: EMERGENCY MEDICINE

## 2022-07-04 VITALS
RESPIRATION RATE: 15 BRPM | HEART RATE: 73 BPM | SYSTOLIC BLOOD PRESSURE: 115 MMHG | WEIGHT: 119.93 LBS | OXYGEN SATURATION: 97 % | TEMPERATURE: 99 F | DIASTOLIC BLOOD PRESSURE: 78 MMHG | HEIGHT: 65 IN

## 2022-07-04 DIAGNOSIS — B02.9 ZOSTER WITHOUT COMPLICATIONS: ICD-10-CM

## 2022-07-04 DIAGNOSIS — Z88.0 ALLERGY STATUS TO PENICILLIN: ICD-10-CM

## 2022-07-04 DIAGNOSIS — F32.A DEPRESSION, UNSPECIFIED: ICD-10-CM

## 2022-07-04 DIAGNOSIS — Z88.6 ALLERGY STATUS TO ANALGESIC AGENT: ICD-10-CM

## 2022-07-04 DIAGNOSIS — E11.9 TYPE 2 DIABETES MELLITUS WITHOUT COMPLICATIONS: ICD-10-CM

## 2022-07-04 DIAGNOSIS — J45.909 UNSPECIFIED ASTHMA, UNCOMPLICATED: ICD-10-CM

## 2022-07-04 DIAGNOSIS — R21 RASH AND OTHER NONSPECIFIC SKIN ERUPTION: ICD-10-CM

## 2022-07-04 PROCEDURE — 99283 EMERGENCY DEPT VISIT LOW MDM: CPT

## 2022-07-04 RX ORDER — TRAMADOL HYDROCHLORIDE 50 MG/1
1 TABLET ORAL
Qty: 18 | Refills: 0
Start: 2022-07-04 | End: 2022-07-06

## 2022-07-04 RX ORDER — SERTRALINE 25 MG/1
1 TABLET, FILM COATED ORAL
Qty: 0 | Refills: 0 | DISCHARGE

## 2022-07-04 RX ORDER — ALBUTEROL 90 UG/1
0 AEROSOL, METERED ORAL
Qty: 0 | Refills: 0 | DISCHARGE

## 2022-07-04 RX ORDER — VALACYCLOVIR 500 MG/1
1 TABLET, FILM COATED ORAL
Qty: 21 | Refills: 0
Start: 2022-07-04 | End: 2022-07-10

## 2022-07-04 RX ORDER — TRAMADOL HYDROCHLORIDE 50 MG/1
50 TABLET ORAL ONCE
Refills: 0 | Status: DISCONTINUED | OUTPATIENT
Start: 2022-07-04 | End: 2022-07-04

## 2022-07-04 RX ORDER — RANITIDINE HYDROCHLORIDE 150 MG/1
1 TABLET, FILM COATED ORAL
Qty: 0 | Refills: 0 | DISCHARGE

## 2022-07-04 RX ORDER — SIMVASTATIN 20 MG/1
1 TABLET, FILM COATED ORAL
Qty: 0 | Refills: 0 | DISCHARGE

## 2022-07-04 RX ORDER — BUDESONIDE AND FORMOTEROL FUMARATE DIHYDRATE 160; 4.5 UG/1; UG/1
2 AEROSOL RESPIRATORY (INHALATION)
Qty: 0 | Refills: 0 | DISCHARGE

## 2022-07-04 RX ORDER — VALACYCLOVIR 500 MG/1
1000 TABLET, FILM COATED ORAL ONCE
Refills: 0 | Status: COMPLETED | OUTPATIENT
Start: 2022-07-04 | End: 2022-07-04

## 2022-07-04 RX ADMIN — TRAMADOL HYDROCHLORIDE 50 MILLIGRAM(S): 50 TABLET ORAL at 17:39

## 2022-07-04 RX ADMIN — Medication 40 MILLIGRAM(S): at 17:38

## 2022-07-04 RX ADMIN — VALACYCLOVIR 1000 MILLIGRAM(S): 500 TABLET, FILM COATED ORAL at 17:38

## 2022-07-04 NOTE — ED ADULT NURSE NOTE - NSIMPLEMENTINTERV_GEN_ALL_ED
Implemented All Universal Safety Interventions:  [place] to call system. Call bell, personal items and telephone within reach. Instruct patient to call for assistance. Room bathroom lighting operational. Non-slip footwear when patient is off stretcher. Physically safe environment: no spills, clutter or unnecessary equipment. Stretcher in lowest position, wheels locked, appropriate side rails in place.

## 2022-07-04 NOTE — ED PROVIDER NOTE - CLINICAL SUMMARY MEDICAL DECISION MAKING FREE TEXT BOX
Pt with rash with dermatomal distribution consistent with Shingles, will treat and D/Cc to f/u with PMD.

## 2022-07-04 NOTE — ED ADULT TRIAGE NOTE - CHIEF COMPLAINT QUOTE
left groin pains going down her leg x 1 week and rash to left knee and foot with swelling and pain x 2 days. H/O DM. added she was exercising the day before pain started.

## 2022-07-04 NOTE — ED PROVIDER NOTE - PATIENT PORTAL LINK FT
You can access the FollowMyHealth Patient Portal offered by [facility] by registering at the following website: [email or web address]. By joining [employer]’s FollowMyHealth portal, you will also be able to view your health information using other applications (apps) compatible with our system.

## 2022-07-04 NOTE — ED PROVIDER NOTE - OBJECTIVE STATEMENT
66 y/o F wtih PMHx of Claustrophobia, DM, Depression and Asthma presents to the ED for a lt leg rash for x2 days. As per pt, was exercising when she had an onset of pain. Denies fever/chills, N/V/D, cough, sore throat or vaccination for Shingles.

## 2022-08-05 RX ORDER — OLOPATADINE HYDROCHLORIDE 1 MG/ML
1 SOLUTION/ DROPS OPHTHALMIC
Qty: 0 | Refills: 0 | DISCHARGE

## 2022-08-05 RX ORDER — POLYETHYLENE GLYCOL 3350 17 G/17G
17 POWDER, FOR SOLUTION ORAL
Qty: 0 | Refills: 0 | DISCHARGE

## 2022-08-05 RX ORDER — GLIMEPIRIDE 1 MG
1 TABLET ORAL
Qty: 0 | Refills: 0 | DISCHARGE

## 2022-08-05 RX ORDER — SIMVASTATIN 20 MG/1
1 TABLET, FILM COATED ORAL
Qty: 0 | Refills: 0 | DISCHARGE

## 2022-08-05 RX ORDER — BUDESONIDE AND FORMOTEROL FUMARATE DIHYDRATE 160; 4.5 UG/1; UG/1
2 AEROSOL RESPIRATORY (INHALATION)
Qty: 0 | Refills: 0 | DISCHARGE

## 2022-08-05 RX ORDER — ALBUTEROL 90 UG/1
3 AEROSOL, METERED ORAL
Qty: 0 | Refills: 0 | DISCHARGE

## 2022-08-05 RX ORDER — SERTRALINE 25 MG/1
1 TABLET, FILM COATED ORAL
Qty: 0 | Refills: 0 | DISCHARGE

## 2023-07-13 NOTE — DISCHARGE NOTE PROVIDER - DISCHARGE DATE
- holding home Farxiga  - HbA1c 11.8%  - FS initially in 400s, s/p insulin gtt at 5u/hr, s/p insulin IVP 5. anion gap WNL  - c/w BRODERICK  - c/w Lantus 25u qbedtime  - FS QAC QHS  - Carb- consistent diet  - A1C 11.8  - Endocrinology team consult email sent for med optimization Currently on Losartan   BP goal < 130/80,   will defer to primary team      Assessed pt/labs/meds and discussed plan of care with patient   Insulin adjustment   Discharge plan  Follow up care    Contact via [employer] Teams during business hours  To reach covering provider access AMION via sunrise tools  For Urgent matters/after-hours/weekends/holidays please page endocrine fellow on call   For nonurgent matters please email [email or web address]    Please note that this patient may be followed by different provider tomorrow.  Notify endocrine 24 hours prior to discharge for final recommendations - TSH 2.46  - c/w levothyroxine 04-Sep-2020 - holding home Farxiga  - HbA1c 11.8%  - FS initially in 400s, s/p insulin gtt at 5u/hr, s/p insulin IVP 5. anion gap WNL  - c/w BRODERICK  - c/w Lantus 25u qbedtime, started admleog TID WM  - FS QAC QHS  - Carb- consistent diet  - A1C 11.8  - Endocrinology team consult email sent for med optimization: appreciate recs: patient refusing injections for home, clinical pharmacist to check coverage for metformin 500 mg 2 tab BID, Farxiga 10 mg daily, Januvia 100 mg daily.

## 2023-07-27 NOTE — ED PROVIDER NOTE - ADMIT DISPOSITION PRESENT ON ADMISSION SEPSIS
[Non-Tender] : non-tender [Smooth] : smooth [General Appearance - Alert] : alert [General Appearance - In No Acute Distress] : in no acute distress [Outer Ear] : the ears and nose were normal in appearance [Sclera] : the sclera and conjunctiva were normal [Oropharynx] : the oropharynx was normal [Neck Appearance] : the appearance of the neck was normal [Auscultation Breath Sounds / Voice Sounds] : lungs were clear to auscultation bilaterally [Edema] : there was no peripheral edema [Heart Rate And Rhythm] : heart rate was normal and rhythm regular [Bowel Sounds] : normal bowel sounds [Abdomen Soft] : soft [Abdomen Tenderness] : non-tender [Abdomen Mass (___ Cm)] : no abdominal mass palpated [Abnormal Walk] : normal gait [Nail Clubbing] : no clubbing  or cyanosis of the fingernails [Motor Tone] : muscle strength and tone were normal [Musculoskeletal - Swelling] : no joint swelling seen [Skin Color & Pigmentation] : normal skin color and pigmentation [Skin Turgor] : normal skin turgor [] : no rash [No Focal Deficits] : no focal deficits [Oriented To Time, Place, And Person] : oriented to person, place, and time [Impaired Insight] : insight and judgment were intact [Affect] : the affect was normal [Scleral Icterus] : No Scleral Icterus [Spider Angioma] : No spider angioma(s) were observed [Abdominal  Ascites] : no ascites [Splenomegaly] : no splenomegaly [Scrotal Edema] : Scrotum is not edematous [Asterixis] : no asterixis observed [Jaundice] : No jaundice [Palmar Erythema] : no Palmar Erythema [Depression] : no depression [Hallucinations] : ~T no ~M hallucinations No

## 2023-07-31 NOTE — ED PROVIDER NOTE - NS ED MD EM SELECTION
Problem: Depression  Goal: Will be euthymic at discharge  Description: INTERVENTIONS:  1. Administer medication as ordered  2. Provide emotional support via 1:1 interaction with staff  3. Encourage involvement in milieu/groups/activities  4. Monitor for social isolation  Outcome: Progressing  Note: Out on unit slightly brighter affect when engaged and social w peers. Denies SI, no self harming behaviors. Daily goal is to discuss d/c plans and follow up care. Problem: Anxiety  Goal: Will report anxiety at manageable levels  Description: INTERVENTIONS:  1. Administer medication as ordered  2. Teach and rehearse alternative coping skills  3.  Provide emotional support with 1:1 interaction with staff  Outcome: Progressing  Flowsheets (Taken 7/30/2023 [id number] by Roman Aguiar RN)  Will report anxiety at manageable levels: Provide emotional support with 1:1 interaction with staff [id number] Comprehensive

## 2025-05-30 NOTE — ED ADULT NURSE NOTE - CAS DISCH ACCOMP BY
Spoke with patient regarding discontinuation of a medication and to ask her psychiatrist  about starting her on a medication for mood.  
Self